# Patient Record
Sex: MALE | Race: ASIAN | Employment: FULL TIME | URBAN - METROPOLITAN AREA
[De-identification: names, ages, dates, MRNs, and addresses within clinical notes are randomized per-mention and may not be internally consistent; named-entity substitution may affect disease eponyms.]

---

## 2019-06-05 ENCOUNTER — TELEPHONE (OUTPATIENT)
Dept: CARDIOLOGY CLINIC | Age: 34
End: 2019-06-05

## 2019-06-05 NOTE — TELEPHONE ENCOUNTER
Faxed records request to Dr. Kina Martinez office. Future Appointments   Date Time Provider Miguel Jodi   6/11/2019  9:00 AM Gerardo Burroughs  [address]        6/6/19-Received records, including ekgs.

## 2019-06-11 ENCOUNTER — OFFICE VISIT (OUTPATIENT)
Dept: CARDIOLOGY CLINIC | Age: 34
End: 2019-06-11

## 2019-06-11 VITALS
DIASTOLIC BLOOD PRESSURE: 94 MMHG | HEART RATE: 78 BPM | OXYGEN SATURATION: 98 % | BODY MASS INDEX: 26.96 KG/M2 | HEIGHT: 69 IN | SYSTOLIC BLOOD PRESSURE: 142 MMHG | RESPIRATION RATE: 16 BRPM | WEIGHT: 182 LBS

## 2019-06-11 DIAGNOSIS — R00.2 PALPITATIONS: ICD-10-CM

## 2019-06-11 DIAGNOSIS — R07.89 OTHER CHEST PAIN: ICD-10-CM

## 2019-06-11 DIAGNOSIS — I10 ESSENTIAL HYPERTENSION: Primary | ICD-10-CM

## 2019-06-11 RX ORDER — ALBUTEROL SULFATE 90 UG/1
AEROSOL, METERED RESPIRATORY (INHALATION)
COMMUNITY
Start: 2019-04-06 | End: 2022-05-06 | Stop reason: ALTCHOICE

## 2019-06-11 RX ORDER — LISINOPRIL 10 MG/1
10 TABLET ORAL DAILY
Qty: 30 TAB | Refills: 1 | Status: SHIPPED | OUTPATIENT
Start: 2019-06-11 | End: 2019-08-22 | Stop reason: SDUPTHER

## 2019-06-11 RX ORDER — PROPRANOLOL HYDROCHLORIDE 120 MG/1
CAPSULE, EXTENDED RELEASE ORAL
Refills: 0 | COMMUNITY
Start: 2019-05-20 | End: 2019-07-31 | Stop reason: SDUPTHER

## 2019-06-11 NOTE — PROGRESS NOTES
HISTORY OF PRESENT ILLNESS  Ana Godfrey is a 35 y.o. male     SUMMARY:   Problem List  Date Reviewed: 6/11/2019          Codes Class Noted    Essential hypertension ICD-10-CM: I10  ICD-9-CM: 401.9  6/11/2019        Other chest pain ICD-10-CM: R07.89  ICD-9-CM: 786.59  6/11/2019              Current Outpatient Medications on File Prior to Visit   Medication Sig    propranolol LA (INDERAL LA) 120 mg SR capsule TAKE 1 CAPSULE BY MOUTH EVERY DAY    PROAIR HFA 90 mcg/actuation inhaler      No current facility-administered medications on file prior to visit. CARDIOLOGY STUDIES TO DATE:  No specialty comments available. Chief Complaint   Patient presents with    New Patient     HPI :  Mr. Brant Colon is a 35year-old referred by [facility] for cardiac evaluation. He has had hypertension since about age 32 when he was diagnosed with Grave's disease. In April, he was in [facility] with asthmatic bronchitis and he had some musculoskeletal type chest pain. All the symptoms resolved with antibiotics, inhalers and Prednisone. His blood pressure has not been well controlled. He is active at work, but gets no regular exercise and does those activities without any worrisome symptoms. He thinks his cholesterol is borderline. He has never smoked. There is no history of diabetes. Family history is negative for premature coronary disease. He has had palpitations off and on for years. He usually notices them if he misses or is late on his Propranolol dose. His EKG from [facility] showed normal sinus rhythm with LVH by volts. He has frequent heartburn, indigestion. He has some mild generalized aches and pains and has had a long history of nervousness, depression, anxiety and difficulty sleeping.         CARDIAC ROS:   negative for chest pain, dyspnea, syncope, orthopnea, paroxysmal nocturnal dyspnea, exertional chest pressure/discomfort, claudication, lower extremity edema    Family History   Problem Relation Age of Onset    Breast Cancer Mother     Hypertension Father        Past Medical History:   Diagnosis Date    Graves disease        GENERAL ROS:  A comprehensive review of systems was negative except for that written in the HPI. Visit Vitals  BP (!) 142/94 (BP 1 Location: Left arm, BP Patient Position: Sitting)   Pulse 78   Resp 16   Ht 5' 9\" (1.753 m)   Wt 182 lb (82.6 kg)   SpO2 98%   BMI 26.88 kg/m²       Wt Readings from Last 3 Encounters:   06/11/19 182 lb (82.6 kg)            BP Readings from Last 3 Encounters:   06/11/19 (!) 142/94       PHYSICAL EXAM  General appearance: alert, cooperative, no distress, appears stated age  Neurologic: Alert and oriented X 3  Neck: supple, symmetrical, trachea midline, no adenopathy, no carotid bruit and no JVD  Lungs: clear to auscultation bilaterally  Heart: regular rate and rhythm, S1, S2 normal, no murmur, click, rub or gallop  Abdomen: soft, non-tender. Bowel sounds normal. No masses,  no organomegaly  Extremities: extremities normal, atraumatic, no cyanosis or edema  Pulses: 2+ and symmetric      ASSESSMENT  Mr. Gloria Lal' EKG abnormalities are of no concern and I reassured him in that regard. I do think we should do something about his blood pressure and he remembers having been on Lisinopril years ago, which seemed to be effective, so we are going to resume that at 10 mg a day. We are going to send him for a fasting lipid profile to see where we  terms of cholesterol and I told him if the palpitations start to become more frequent or bothersome to let us know and we will provide him with an event monitor. I do not think he needs any imaging or stress testing at this time. current treatment plan is effective, no change in therapy  lab results and schedule of future lab studies reviewed with patient  reviewed diet, exercise and weight control    Encounter Diagnoses   Name Primary?     Essential hypertension Yes    Other chest pain     Palpitations      Orders Placed This Encounter    LIPID PANEL    propranolol LA (INDERAL LA) 120 mg SR capsule    PROAIR HFA 90 mcg/actuation inhaler    lisinopril (PRINIVIL, ZESTRIL) 10 mg tablet       Follow-up and Dispositions    · Return in about 1 month (around 7/9/2019).          Norma Scherer MD  6/11/2019

## 2019-06-11 NOTE — PROGRESS NOTES
1. Have you been to the ER, urgent care clinic since your last visit? Hospitalized since your last visit? Yes When: 4/9/2019 Where: [facility] Reason for visit: asthma    2. Have you seen or consulted any other health care providers outside of the [address] [place] [address] since your last visit? Include any pap smears or colon screening. No    Patient reports Hx: Graves Disease  Patient reports Chest tightness years.      Visit Vitals  BP (!) 142/94 (BP 1 Location: Left arm, BP Patient Position: Sitting)   Pulse 78   Resp 16   Ht 5' 9\" (1.753 m)   Wt 182 lb (82.6 kg)   SpO2 98%   BMI 26.88 kg/m²

## 2019-06-18 LAB
CHOLEST SERPL-MCNC: 185 MG/DL (ref 100–199)
HDLC SERPL-MCNC: 37 MG/DL
INTERPRETATION, 910389: NORMAL
LDLC SERPL CALC-MCNC: 110 MG/DL (ref 0–99)
TRIGL SERPL-MCNC: 192 MG/DL (ref 0–149)
VLDLC SERPL CALC-MCNC: 38 MG/DL (ref 5–40)

## 2019-06-18 NOTE — PROGRESS NOTES
Chol ok, triglycerides slightly elevated. Need to cut back on carbs and sweets. No need for medication at this point.

## 2019-06-18 NOTE — PROGRESS NOTES
Called patient. Left message on safe voicemail stating results and Dr. Edd Decker message. Also stated I am mailing him a results letter. Result letter in mail.

## 2019-07-25 ENCOUNTER — OFFICE VISIT (OUTPATIENT)
Dept: CARDIOLOGY CLINIC | Age: 34
End: 2019-07-25

## 2019-07-25 VITALS
OXYGEN SATURATION: 98 % | HEART RATE: 74 BPM | SYSTOLIC BLOOD PRESSURE: 122 MMHG | DIASTOLIC BLOOD PRESSURE: 84 MMHG | BODY MASS INDEX: 26.28 KG/M2 | RESPIRATION RATE: 16 BRPM | HEIGHT: 69 IN | WEIGHT: 177.4 LBS

## 2019-07-25 DIAGNOSIS — I10 ESSENTIAL HYPERTENSION: Primary | ICD-10-CM

## 2019-07-25 DIAGNOSIS — R07.89 OTHER CHEST PAIN: ICD-10-CM

## 2019-07-25 DIAGNOSIS — R00.2 PALPITATIONS: ICD-10-CM

## 2019-07-25 NOTE — PROGRESS NOTES
HISTORY OF PRESENT Zeb Moura is a 29 y.o. male     SUMMARY:   Problem List  Date Reviewed: 7/24/2019          Codes Class Noted    Essential hypertension ICD-10-CM: I10  ICD-9-CM: 401.9  6/11/2019        Other chest pain ICD-10-CM: R07.89  ICD-9-CM: 786.59  6/11/2019              Current Outpatient Medications on File Prior to Visit   Medication Sig    propranolol LA (INDERAL LA) 120 mg SR capsule TAKE 1 CAPSULE BY MOUTH EVERY DAY    PROAIR HFA 90 mcg/actuation inhaler     lisinopril (PRINIVIL, ZESTRIL) 10 mg tablet Take 1 Tab by mouth daily. No current facility-administered medications on file prior to visit. CARDIOLOGY STUDIES TO DATE:  No specialty comments available. Chief Complaint   Patient presents with    Hypertension     HPI :  Mr. Phu Cartagena' blood pressure is doing much, much better, but he thinks the Lisinopril may be causing some erectile dysfunction and extra sleepiness. He is active. He is not exercising. It sounds like he used to be on Nuvigil for narcolepsy and he has not had any of that for quite some time. No recent chest pain or palpitations. CARDIAC ROS:   negative for dyspnea, syncope, orthopnea, paroxysmal nocturnal dyspnea, exertional chest pressure/discomfort, claudication, lower extremity edema    Family History   Problem Relation Age of Onset    Breast Cancer Mother     Hypertension Father        Past Medical History:   Diagnosis Date    Graves disease        GENERAL ROS:  A comprehensive review of systems was negative except for that written in the HPI.     Visit Vitals  /84 (BP 1 Location: Left arm, BP Patient Position: Sitting)   Pulse 74   Resp 16   Ht 5' 9\" (1.753 m)   Wt 177 lb 6.4 oz (80.5 kg)   SpO2 98%   BMI 26.20 kg/m²       Wt Readings from Last 3 Encounters:   07/25/19 177 lb 6.4 oz (80.5 kg)   06/11/19 182 lb (82.6 kg)            BP Readings from Last 3 Encounters:   07/25/19 122/84   06/11/19 (!) 142/94       PHYSICAL EXAM  General appearance: alert, cooperative, no distress, appears stated age  Neurologic: Alert and oriented X 3  Neck: supple, symmetrical, trachea midline, no adenopathy, no carotid bruit and no JVD  Lungs: clear to auscultation bilaterally  Heart: regular rate and rhythm, S1, S2 normal, no murmur, click, rub or gallop  Extremities: extremities normal, atraumatic, no cyanosis or edema    Lab Results   Component Value Date/Time    Cholesterol, total 185 06/17/2019 10:41 AM    HDL Cholesterol 37 (L) 06/17/2019 10:41 AM    LDL, calculated 110 (H) 06/17/2019 10:41 AM    Triglyceride 192 (H) 06/17/2019 10:41 AM     ASSESSMENT  Mr. Slime Latham is stable and asymptomatic at this point. Given his concerns, I think it is reasonable for him to cut his Lisinopril dose in half for a while and monitor his blood pressure. He will let us know how it is going. current treatment plan is effective, no change in therapy  lab results and schedule of future lab studies reviewed with patient  reviewed diet, exercise and weight control    Encounter Diagnoses   Name Primary?  Essential hypertension Yes    Other chest pain     Palpitations      No orders of the defined types were placed in this encounter. Follow-up and Dispositions    · Return in about 3 months (around 10/25/2019).          Jose Ngo MD  7/25/2019

## 2019-07-31 DIAGNOSIS — R00.2 PALPITATIONS: ICD-10-CM

## 2019-07-31 DIAGNOSIS — I10 ESSENTIAL HYPERTENSION: Primary | ICD-10-CM

## 2019-07-31 RX ORDER — PROPRANOLOL HYDROCHLORIDE 120 MG/1
CAPSULE, EXTENDED RELEASE ORAL
Qty: 30 CAP | Refills: 1 | Status: SHIPPED | OUTPATIENT
Start: 2019-07-31 | End: 2019-10-08 | Stop reason: SDUPTHER

## 2019-07-31 NOTE — TELEPHONE ENCOUNTER
Requested Prescriptions     Signed Prescriptions Disp Refills    propranolol LA (INDERAL LA) 120 mg SR capsule 30 Cap 1     Sig: TAKE 1 CAPSULE BY MOUTH EVERY DAY     Authorizing Provider: Dhara Beltrán     Ordering User: Jose Alberto Heller    Per Dr. Heather Waller verbal orders

## 2019-08-15 ENCOUNTER — TELEPHONE (OUTPATIENT)
Dept: CARDIOLOGY CLINIC | Age: 34
End: 2019-08-15

## 2019-08-15 NOTE — TELEPHONE ENCOUNTER
Claudio Nguyen (NP) with [employer] called to see it if would be ok for the pt to start back taking Nuvigil  mg. He can be reached or a message can be left at the  at [phone].     [employer]

## 2019-08-15 NOTE — TELEPHONE ENCOUNTER
Dr. Kary Clinton-  I see you mentioned this in your note. Please advise if okay to restart Nuvigil from cardiac standpoint. Thanks.

## 2019-08-22 DIAGNOSIS — I10 ESSENTIAL HYPERTENSION: ICD-10-CM

## 2019-08-22 DIAGNOSIS — R00.2 PALPITATIONS: ICD-10-CM

## 2019-08-22 DIAGNOSIS — R07.89 OTHER CHEST PAIN: ICD-10-CM

## 2019-08-23 RX ORDER — LISINOPRIL 10 MG/1
TABLET ORAL
Qty: 30 TAB | Refills: 5 | Status: SHIPPED | OUTPATIENT
Start: 2019-08-23 | End: 2020-02-18

## 2019-08-23 NOTE — TELEPHONE ENCOUNTER
Requested Prescriptions     Signed Prescriptions Disp Refills    lisinopril (PRINIVIL, ZESTRIL) 10 mg tablet 30 Tab 5     Sig: TAKE 1 TABLET BY MOUTH EVERY DAY     Authorizing Provider: Jane Mackenzie     Ordering User: Jeanette Frank verbal orders

## 2019-09-05 ENCOUNTER — OFFICE VISIT (OUTPATIENT)
Dept: CARDIOLOGY CLINIC | Age: 34
End: 2019-09-05

## 2019-09-05 VITALS
WEIGHT: 174 LBS | SYSTOLIC BLOOD PRESSURE: 108 MMHG | BODY MASS INDEX: 25.77 KG/M2 | OXYGEN SATURATION: 97 % | HEIGHT: 69 IN | RESPIRATION RATE: 18 BRPM | DIASTOLIC BLOOD PRESSURE: 72 MMHG | HEART RATE: 62 BPM

## 2019-09-05 DIAGNOSIS — R00.2 PALPITATIONS: ICD-10-CM

## 2019-09-05 DIAGNOSIS — I10 ESSENTIAL HYPERTENSION: Primary | ICD-10-CM

## 2019-09-05 RX ORDER — FLUOXETINE HYDROCHLORIDE 20 MG/1
20 CAPSULE ORAL DAILY
Refills: 2 | COMMUNITY
Start: 2019-08-14

## 2019-09-05 RX ORDER — HYDROXYZINE PAMOATE 25 MG/1
25 CAPSULE ORAL AS NEEDED
Refills: 1 | COMMUNITY
Start: 2019-08-14

## 2019-10-08 DIAGNOSIS — I10 ESSENTIAL HYPERTENSION: ICD-10-CM

## 2019-10-08 DIAGNOSIS — R00.2 PALPITATIONS: ICD-10-CM

## 2019-10-08 RX ORDER — PROPRANOLOL HYDROCHLORIDE 120 MG/1
CAPSULE, EXTENDED RELEASE ORAL
Qty: 90 CAP | Refills: 1 | Status: SHIPPED | OUTPATIENT
Start: 2019-10-08 | End: 2020-04-07 | Stop reason: SDUPTHER

## 2019-10-08 NOTE — TELEPHONE ENCOUNTER
Requested Prescriptions     Signed Prescriptions Disp Refills    propranolol LA (INDERAL LA) 120 mg SR capsule 90 Cap 1     Sig: TAKE 1 CAPSULE BY MOUTH EVERY DAY     Authorizing Provider: Roxanna Wallace     Ordering User: Susie Rubio     Verbal order per Dr. Kalani Perez.       Future Appointments   Date Time Provider Miguel Zapata   12/3/2019  8:40 AM Caren Grant  [address]

## 2019-12-03 ENCOUNTER — OFFICE VISIT (OUTPATIENT)
Dept: CARDIOLOGY CLINIC | Age: 34
End: 2019-12-03

## 2019-12-03 VITALS
HEIGHT: 69 IN | SYSTOLIC BLOOD PRESSURE: 116 MMHG | HEART RATE: 64 BPM | WEIGHT: 179.2 LBS | BODY MASS INDEX: 26.54 KG/M2 | DIASTOLIC BLOOD PRESSURE: 76 MMHG | RESPIRATION RATE: 16 BRPM | OXYGEN SATURATION: 99 %

## 2019-12-03 DIAGNOSIS — I10 ESSENTIAL HYPERTENSION: Primary | ICD-10-CM

## 2019-12-03 DIAGNOSIS — R00.2 PALPITATIONS: ICD-10-CM

## 2019-12-03 RX ORDER — ARMODAFINIL 50 MG/1
TABLET ORAL
Refills: 0 | COMMUNITY
Start: 2019-11-19

## 2019-12-03 NOTE — PROGRESS NOTES
HISTORY OF PRESENT ILLNESS  Sherry Blair is a 29 y.o. male     SUMMARY:   Problem List  Date Reviewed: 12/2/2019          Codes Class Noted    Essential hypertension ICD-10-CM: I10  ICD-9-CM: 401.9  6/11/2019        Other chest pain ICD-10-CM: R07.89  ICD-9-CM: 786.59  6/11/2019              Current Outpatient Medications on File Prior to Visit   Medication Sig    armodafinil 50 mg tab TAKE 1 TABLET BY MOUTH EVERY DAY AS NEEDED    propranolol LA (INDERAL LA) 120 mg SR capsule TAKE 1 CAPSULE BY MOUTH EVERY DAY    FLUoxetine (PROZAC) 20 mg capsule Take 20 mg by mouth daily.  hydrOXYzine pamoate (VISTARIL) 25 mg capsule Take 25 mg by mouth as needed.  lisinopril (PRINIVIL, ZESTRIL) 10 mg tablet TAKE 1 TABLET BY MOUTH EVERY DAY    PROAIR HFA 90 mcg/actuation inhaler every four (4) hours as needed. No current facility-administered medications on file prior to visit. CARDIOLOGY STUDIES TO DATE:  No specialty comments available. Chief Complaint   Patient presents with    Hypertension     HPI :  Mr. Justen Reina is doing great. He is active, but not exercising as much as he had been. No problem with his medications and blood pressure is under excellent control. No recent palpitations. CARDIAC ROS:   negative for chest pain, dyspnea, syncope, orthopnea, paroxysmal nocturnal dyspnea, exertional chest pressure/discomfort, claudication, lower extremity edema    Family History   Problem Relation Age of Onset    Breast Cancer Mother     Hypertension Father        Past Medical History:   Diagnosis Date    Graves disease        GENERAL ROS:  A comprehensive review of systems was negative except for that written in the HPI.     Visit Vitals  /76 (BP 1 Location: Left arm, BP Patient Position: Sitting)   Pulse 64   Resp 16   Ht 5' 9\" (1.753 m)   Wt 179 lb 3.2 oz (81.3 kg)   SpO2 99%   BMI 26.46 kg/m²       Wt Readings from Last 3 Encounters:   12/03/19 179 lb 3.2 oz (81.3 kg)   09/05/19 174 lb (78.9 kg)   07/25/19 177 lb 6.4 oz (80.5 kg)            BP Readings from Last 3 Encounters:   12/03/19 116/76   09/05/19 108/72   07/25/19 122/84       PHYSICAL EXAM  General appearance: alert, cooperative, no distress, appears stated age  Neurologic: Alert and oriented X 3  Neck: supple, symmetrical, trachea midline, no adenopathy, no carotid bruit and no JVD  Lungs: clear to auscultation bilaterally  Heart: regular rate and rhythm, S1, S2 normal, no murmur, click, rub or gallop  Extremities: extremities normal, atraumatic, no cyanosis or edema    Lab Results   Component Value Date/Time    Cholesterol, total 185 06/17/2019 10:41 AM    HDL Cholesterol 37 (L) 06/17/2019 10:41 AM    LDL, calculated 110 (H) 06/17/2019 10:41 AM    Triglyceride 192 (H) 06/17/2019 10:41 AM     ASSESSMENT  Mr. Anish Iverson is stable and asymptomatic and well compensated on a good medical regimen and needs no cardiac testing at this time. current treatment plan is effective, no change in therapy  lab results and schedule of future lab studies reviewed with patient  reviewed diet, exercise and weight control    Encounter Diagnoses   Name Primary?  Essential hypertension Yes    Palpitations      Orders Placed This Encounter    armodafinil 50 mg tab       Follow-up and Dispositions    · Return in about 3 months (around 3/3/2020).          Idalia Lieberman MD  12/3/2019

## 2020-02-17 DIAGNOSIS — R00.2 PALPITATIONS: ICD-10-CM

## 2020-02-17 DIAGNOSIS — R07.89 OTHER CHEST PAIN: ICD-10-CM

## 2020-02-17 DIAGNOSIS — I10 ESSENTIAL HYPERTENSION: ICD-10-CM

## 2020-02-18 RX ORDER — LISINOPRIL 10 MG/1
TABLET ORAL
Qty: 30 TAB | Refills: 5 | Status: SHIPPED | OUTPATIENT
Start: 2020-02-18 | End: 2020-08-24

## 2020-04-07 DIAGNOSIS — I10 ESSENTIAL HYPERTENSION: ICD-10-CM

## 2020-04-07 DIAGNOSIS — R00.2 PALPITATIONS: ICD-10-CM

## 2020-04-07 RX ORDER — PROPRANOLOL HYDROCHLORIDE 120 MG/1
120 CAPSULE, EXTENDED RELEASE ORAL DAILY
Qty: 90 CAP | Refills: 1 | Status: SHIPPED | OUTPATIENT
Start: 2020-04-07 | End: 2020-10-05

## 2020-04-07 NOTE — TELEPHONE ENCOUNTER
Requested Prescriptions     Signed Prescriptions Disp Refills    propranolol LA (INDERAL LA) 120 mg SR capsule 90 Cap 1     Sig: Take 1 Cap by mouth daily.      Authorizing Provider: Pearl Avilez     Ordering User: Justyn Stack    Per Dr. Rossy Lynne verbal orders     Future Appointments   Date Time Provider Miguel Zapata   6/4/2020  8:40 AM Rosalea Runner,  [address]

## 2020-06-01 ENCOUNTER — TELEPHONE (OUTPATIENT)
Dept: CARDIOLOGY CLINIC | Age: 35
End: 2020-06-01

## 2020-06-01 NOTE — TELEPHONE ENCOUNTER
LM to rs 6/4/20 appt with Dr. Syl Bonilla, pt needs VV or appt after 7/1/20, please message Abigail Wheeler to schedule if VV needed

## 2020-08-23 DIAGNOSIS — R07.89 OTHER CHEST PAIN: ICD-10-CM

## 2020-08-23 DIAGNOSIS — I10 ESSENTIAL HYPERTENSION: ICD-10-CM

## 2020-08-23 DIAGNOSIS — R00.2 PALPITATIONS: ICD-10-CM

## 2020-08-24 RX ORDER — LISINOPRIL 10 MG/1
TABLET ORAL
Qty: 30 TAB | Refills: 2 | Status: SHIPPED | OUTPATIENT
Start: 2020-08-24 | End: 2020-09-02 | Stop reason: SDUPTHER

## 2020-08-24 NOTE — TELEPHONE ENCOUNTER
Requested Prescriptions     Signed Prescriptions Disp Refills    lisinopriL (PRINIVIL, ZESTRIL) 10 mg tablet 30 Tab 2     Sig: TAKE 1 TABLET BY MOUTH EVERY DAY     Authorizing Provider: Darion Lewis     Ordering User: Justina Persons    Per Dr. Lotus Salmeron verbal orders

## 2020-09-02 ENCOUNTER — VIRTUAL VISIT (OUTPATIENT)
Dept: CARDIOLOGY CLINIC | Age: 35
End: 2020-09-02
Payer: MEDICAID

## 2020-09-02 DIAGNOSIS — R00.2 PALPITATIONS: ICD-10-CM

## 2020-09-02 DIAGNOSIS — R07.89 OTHER CHEST PAIN: ICD-10-CM

## 2020-09-02 DIAGNOSIS — I10 ESSENTIAL HYPERTENSION: Primary | ICD-10-CM

## 2020-09-02 PROCEDURE — 99213 OFFICE O/P EST LOW 20 MIN: CPT | Performed by: SPECIALIST

## 2020-09-02 RX ORDER — LISINOPRIL 10 MG/1
TABLET ORAL
Qty: 90 TAB | Refills: 3 | Status: SHIPPED | OUTPATIENT
Start: 2020-09-02 | End: 2021-09-30

## 2020-09-02 NOTE — PROGRESS NOTES
[facility] Telemedicine Encounter                                                         Pursuant to the emergency declaration under the [facility], [facility] waiver authority and the [employer] Act, this Virtual  Visit was conducted, with patient's consent, to reduce the patient's risk of exposure to COVID-19 and provide continuity of care for an established patient. Services were provided through a video synchronous discussion virtually to substitute for in-person clinic visit. No specialty comments available. Current Outpatient Medications on File Prior to Visit   Medication Sig    lisinopriL (PRINIVIL, ZESTRIL) 10 mg tablet TAKE 1 TABLET BY MOUTH EVERY DAY    propranolol LA (INDERAL LA) 120 mg SR capsule Take 1 Cap by mouth daily.  armodafinil 50 mg tab TAKE 1 TABLET BY MOUTH EVERY DAY AS NEEDED    FLUoxetine (PROZAC) 20 mg capsule Take 20 mg by mouth daily.  hydrOXYzine pamoate (VISTARIL) 25 mg capsule Take 25 mg by mouth as needed.  PROAIR HFA 90 mcg/actuation inhaler every four (4) hours as needed. No current facility-administered medications on file prior to visit. Subjective/HPI:   Loida Rosas is a 28 y.o. male who was seen by synchronous (real-time) audio-video technology on 9/2/2020. He is doing great. Still working nights and gets a lot of exercise as part of his job. No recent palpitations or chest pain, and most recent blood pressure was 118/71. His weight is stable. PCP Provider  Wojciech Bernabe MD  Past Medical History:   Diagnosis Date    Graves disease       No past surgical history on file.   Allergies   Allergen Reactions    Grape Anaphylaxis    Amoxicillin Hives    Grape Seed Other (comments)     Throat swelling, back pain    Other Medication Unknown (comments)     Antibiotic    Wellbutrin [Bupropion Hcl] Itching      Family History   Problem Relation Age of Onset    Breast Cancer Mother     Hypertension Father       Current Outpatient Medications   Medication Sig    lisinopriL (PRINIVIL, ZESTRIL) 10 mg tablet TAKE 1 TABLET BY MOUTH EVERY DAY    propranolol LA (INDERAL LA) 120 mg SR capsule Take 1 Cap by mouth daily.  armodafinil 50 mg tab TAKE 1 TABLET BY MOUTH EVERY DAY AS NEEDED    FLUoxetine (PROZAC) 20 mg capsule Take 20 mg by mouth daily.  hydrOXYzine pamoate (VISTARIL) 25 mg capsule Take 25 mg by mouth as needed.  PROAIR HFA 90 mcg/actuation inhaler every four (4) hours as needed. No current facility-administered medications for this visit. There were no vitals filed for this visit.   Social History     Socioeconomic History    Marital status:      Spouse name: Not on file    Number of children: Not on file    Years of education: Not on file    Highest education level: Not on file   Occupational History    Not on file   Social Needs    Financial resource strain: Not on file    Food insecurity     Worry: Not on file     Inability: Not on file    Transportation needs     Medical: Not on file     Non-medical: Not on file   Tobacco Use    Smoking status: Never Smoker    Smokeless tobacco: Never Used   Substance and Sexual Activity    Alcohol use: Yes     Comment: occas    Drug use: Never    Sexual activity: Not on file   Lifestyle    Physical activity     Days per week: Not on file     Minutes per session: Not on file    Stress: Not on file   Relationships    Social connections     Talks on phone: Not on file     Gets together: Not on file     Attends [employer] service: Not on file     Active member of club or organization: Not on file     Attends meetings of clubs or organizations: Not on file     Relationship status: Not on file    Intimate partner violence     Fear of current or ex partner: Not on file     Emotionally abused: Not on file     Physically abused: Not on file     Forced sexual activity: Not on file Other Topics Concern    Not on file   Social History Narrative    Not on file       Review of Symptoms  11 systems reviewed, negative other than as stated in the HPI    Physical Exam:    Due to this being a TeleHealth evaluation, many elements of the physical examination are unable to be assessed. General: Well developed, in no acute distress, cooperative and alert  HEENT: Pupils equal/round. No marked JVD visible on video. Respiratory: No audible wheezing, no signs of respiratory distress, lips non cyanotic  Extremities:  No edema  Neuro: A&Ox3, speech clear, no facial droop, answering questions appropriately  Skin: Skin color is normal. No rashes or lesions. Non diaphoretic on visible skin during exam      Cardiology Labs:  Lab Results   Component Value Date/Time    Cholesterol, total 185 06/17/2019 10:41 AM    HDL Cholesterol 37 (L) 06/17/2019 10:41 AM    LDL, calculated 110 (H) 06/17/2019 10:41 AM    Triglyceride 192 (H) 06/17/2019 10:41 AM       No results found for: NA, K, CL, CO2, AGAP, GLU, BUN, CREA, BUCR, GFRAA, GFRNA, CA, TBIL, TBILI, AP, TP, ALB, GLOB, AGRAT, ALT      Assessment:     Diagnoses and all orders for this visit:    1. Essential hypertension    2. Palpitations    3. Other chest pain        ICD-10-CM ICD-9-CM    1. Essential hypertension  I10 401.9    2. Palpitations  R00.2 785.1    3. Other chest pain  R07.89 786.59      No orders of the defined types were placed in this encounter. Plan:   He is stable and asymptomatic, well compensated on a good medical regimen and needs no cardiac testing at this time. current treatment plan is effective, no change in therapy  lab results and schedule of future lab studies reviewed with patient  reviewed diet, exercise and weight control  We discussed the expected course, resolution and complications of the diagnosis(es) in detail. Medication risks, benefits, costs, interactions, and alternatives were discussed as indicated.   I advised him to contact the office if his condition worsens, changes or fails to improve as anticipated. He expressed understanding with the diagnosis(es) and plan    Idalia Lieberman MD    Greater than 20 minutes was spent in direct video patient care, planning and chart review. This visit was conducted using [employer] telemedicine services.

## 2020-10-04 DIAGNOSIS — R00.2 PALPITATIONS: ICD-10-CM

## 2020-10-04 DIAGNOSIS — I10 ESSENTIAL HYPERTENSION: ICD-10-CM

## 2020-10-05 RX ORDER — PROPRANOLOL HYDROCHLORIDE 120 MG/1
CAPSULE, EXTENDED RELEASE ORAL
Qty: 90 CAP | Refills: 1 | Status: SHIPPED | OUTPATIENT
Start: 2020-10-05 | End: 2021-04-12 | Stop reason: SDUPTHER

## 2020-10-05 NOTE — TELEPHONE ENCOUNTER
Requested Prescriptions     Signed Prescriptions Disp Refills    propranolol LA (INDERAL LA) 120 mg SR capsule 90 Cap 1     Sig: TAKE 1 CAPSULE BY MOUTH EVERY DAY     Authorizing Provider: Kayleigh Roper     Ordering User: Janie Snellen    Per Dr. Rita Acosta verbal orders

## 2021-02-24 ENCOUNTER — TELEPHONE (OUTPATIENT)
Dept: CARDIOLOGY CLINIC | Age: 36
End: 2021-02-24

## 2021-04-12 DIAGNOSIS — I10 ESSENTIAL HYPERTENSION: ICD-10-CM

## 2021-04-12 DIAGNOSIS — R00.2 PALPITATIONS: ICD-10-CM

## 2021-04-12 RX ORDER — PROPRANOLOL HYDROCHLORIDE 120 MG/1
CAPSULE, EXTENDED RELEASE ORAL
Qty: 90 CAP | Refills: 0 | Status: SHIPPED | OUTPATIENT
Start: 2021-04-12 | End: 2021-04-20 | Stop reason: SDUPTHER

## 2021-04-12 NOTE — TELEPHONE ENCOUNTER
Requested Prescriptions     Signed Prescriptions Disp Refills    propranolol LA (INDERAL LA) 120 mg SR capsule 90 Cap 0     Sig: TAKE 1 CAPSULE BY MOUTH EVERY DAY     Authorizing Provider: Heidy Alva     Ordering User: Quintin Ayala    Per Dr. Jt Meek verbal orders     Future Appointments   Date Time Provider Miguel Zapata   4/16/2021  3:00 PM MD VANDANA Boyle [facility]

## 2021-04-20 ENCOUNTER — OFFICE VISIT (OUTPATIENT)
Dept: CARDIOLOGY CLINIC | Age: 36
End: 2021-04-20
Payer: MEDICAID

## 2021-04-20 VITALS
SYSTOLIC BLOOD PRESSURE: 118 MMHG | HEIGHT: 69 IN | OXYGEN SATURATION: 98 % | WEIGHT: 185 LBS | DIASTOLIC BLOOD PRESSURE: 82 MMHG | RESPIRATION RATE: 18 BRPM | BODY MASS INDEX: 27.4 KG/M2 | HEART RATE: 81 BPM

## 2021-04-20 DIAGNOSIS — I10 ESSENTIAL HYPERTENSION: Primary | ICD-10-CM

## 2021-04-20 DIAGNOSIS — R00.2 PALPITATIONS: ICD-10-CM

## 2021-04-20 DIAGNOSIS — R07.89 OTHER CHEST PAIN: ICD-10-CM

## 2021-04-20 PROCEDURE — 99213 OFFICE O/P EST LOW 20 MIN: CPT | Performed by: SPECIALIST

## 2021-04-20 RX ORDER — BUSPIRONE HYDROCHLORIDE 5 MG/1
5 TABLET ORAL 2 TIMES DAILY
COMMUNITY
Start: 2021-03-28

## 2021-04-20 RX ORDER — PROPRANOLOL HYDROCHLORIDE 120 MG/1
CAPSULE, EXTENDED RELEASE ORAL
Qty: 90 CAP | Refills: 3 | Status: SHIPPED | OUTPATIENT
Start: 2021-04-20 | End: 2022-05-18

## 2021-04-20 NOTE — PROGRESS NOTES
HISTORY OF PRESENT ILLNESS  Karthik Chino is a 28 y.o. male     SUMMARY:   Problem List  Date Reviewed: 4/20/2021          Codes Class Noted    Essential hypertension ICD-10-CM: I10  ICD-9-CM: 401.9  6/11/2019        Other chest pain ICD-10-CM: R07.89  ICD-9-CM: 786.59  6/11/2019              Current Outpatient Medications on File Prior to Visit   Medication Sig    busPIRone (BUSPAR) 5 mg tablet Take 5 mg by mouth two (2) times a day.  propranolol LA (INDERAL LA) 120 mg SR capsule TAKE 1 CAPSULE BY MOUTH EVERY DAY    lisinopriL (PRINIVIL, ZESTRIL) 10 mg tablet TAKE 1 TABLET BY MOUTH EVERY DAY    armodafinil 50 mg tab TAKE 1 TABLET BY MOUTH EVERY DAY AS NEEDED    FLUoxetine (PROZAC) 20 mg capsule Take 20 mg by mouth daily.  hydrOXYzine pamoate (VISTARIL) 25 mg capsule Take 25 mg by mouth as needed.  PROAIR HFA 90 mcg/actuation inhaler every four (4) hours as needed. No current facility-administered medications on file prior to visit. CARDIOLOGY STUDIES TO DATE:  No specialty comments available. Chief Complaint   Patient presents with    Follow-up     HPI :  He is doing great. Still working nights and that involves a lot of heavy physical activity which he tolerates without difficulty. Blood pressure is well controlled as is his anxiety. CARDIAC ROS:   negative for chest pain, dyspnea, palpitations, syncope, orthopnea, paroxysmal nocturnal dyspnea, exertional chest pressure/discomfort, claudication, lower extremity edema    Family History   Problem Relation Age of Onset    Breast Cancer Mother     Hypertension Father        Past Medical History:   Diagnosis Date    Graves disease        GENERAL ROS:  A comprehensive review of systems was negative except for that written in the HPI.     Visit Vitals  /82 (BP 1 Location: Left upper arm, BP Patient Position: Sitting, BP Cuff Size: Adult)   Pulse 81   Resp 18   Ht 5' 9\" (1.753 m)   Wt 185 lb (83.9 kg)   SpO2 98%   BMI 27.32 kg/m²       Wt Readings from Last 3 Encounters:   04/20/21 185 lb (83.9 kg)   12/03/19 179 lb 3.2 oz (81.3 kg)   09/05/19 174 lb (78.9 kg)            BP Readings from Last 3 Encounters:   04/20/21 118/82   12/03/19 116/76   09/05/19 108/72       PHYSICAL EXAM  General appearance: alert, cooperative, no distress, appears stated age  Neurologic: Alert and oriented X 3  Neck: supple, symmetrical, trachea midline, no adenopathy, no carotid bruit and no JVD  Lungs: clear to auscultation bilaterally  Heart: regular rate and rhythm, S1, S2 normal, no murmur, click, rub or gallop    Lab Results   Component Value Date/Time    Cholesterol, total 185 06/17/2019 10:41 AM    HDL Cholesterol 37 (L) 06/17/2019 10:41 AM    LDL, calculated 110 (H) 06/17/2019 10:41 AM    Triglyceride 192 (H) 06/17/2019 10:41 AM     ASSESSMENT :      He is stable and asymptomatic, well compensated on a good medical regimen and needs no cardiac testing at this time. current treatment plan is effective, no change in therapy  lab results and schedule of future lab studies reviewed with patient  reviewed diet, exercise and weight control    Encounter Diagnoses   Name Primary?  Essential hypertension Yes    Other chest pain     Palpitations      Orders Placed This Encounter    busPIRone (BUSPAR) 5 mg tablet       Follow-up and Dispositions    · Return in about 6 months (around 10/20/2021). Federico Butcher MD  4/20/2021  Please note that this dictation was completed with [employer], the computer voice recognition software. Quite often unanticipated grammatical, syntax, homophones, and other interpretive errors are inadvertently transcribed by the computer software. Please disregard these errors. Please excuse any errors that have escaped final proofreading. Thank you.

## 2021-09-30 DIAGNOSIS — R07.89 OTHER CHEST PAIN: ICD-10-CM

## 2021-09-30 DIAGNOSIS — R00.2 PALPITATIONS: ICD-10-CM

## 2021-09-30 DIAGNOSIS — I10 ESSENTIAL HYPERTENSION: ICD-10-CM

## 2021-09-30 RX ORDER — LISINOPRIL 10 MG/1
TABLET ORAL
Qty: 90 TABLET | Refills: 3 | Status: SHIPPED | OUTPATIENT
Start: 2021-09-30 | End: 2021-10-22

## 2021-09-30 NOTE — TELEPHONE ENCOUNTER
Requested Prescriptions     Signed Prescriptions Disp Refills    lisinopriL (PRINIVIL, ZESTRIL) 10 mg tablet 90 Tablet 3     Sig: TAKE 1 TABLET BY MOUTH EVERY DAY     Authorizing Provider: Cherylene Brand     Ordering User: Jonny Mejía    Per Dr. Perry Ends verbal orders

## 2021-10-22 ENCOUNTER — OFFICE VISIT (OUTPATIENT)
Dept: CARDIOLOGY CLINIC | Age: 36
End: 2021-10-22
Payer: MEDICAID

## 2021-10-22 VITALS
HEIGHT: 69 IN | BODY MASS INDEX: 27.55 KG/M2 | WEIGHT: 186 LBS | DIASTOLIC BLOOD PRESSURE: 90 MMHG | HEART RATE: 74 BPM | SYSTOLIC BLOOD PRESSURE: 142 MMHG | RESPIRATION RATE: 18 BRPM | OXYGEN SATURATION: 97 %

## 2021-10-22 DIAGNOSIS — R00.2 PALPITATIONS: ICD-10-CM

## 2021-10-22 DIAGNOSIS — R07.89 OTHER CHEST PAIN: ICD-10-CM

## 2021-10-22 DIAGNOSIS — I10 ESSENTIAL HYPERTENSION: Primary | ICD-10-CM

## 2021-10-22 PROCEDURE — 99214 OFFICE O/P EST MOD 30 MIN: CPT | Performed by: SPECIALIST

## 2021-10-22 RX ORDER — LISINOPRIL 20 MG/1
20 TABLET ORAL DAILY
Qty: 90 TABLET | Refills: 2 | Status: SHIPPED | OUTPATIENT
Start: 2021-10-22 | End: 2022-05-06 | Stop reason: SDUPTHER

## 2021-10-22 NOTE — PROGRESS NOTES
HISTORY OF PRESENT ILLNESS  Morteza Webber is a 39 y.o. male     SUMMARY:   Problem List  Date Reviewed: 10/22/2021        Codes Class Noted    Essential hypertension ICD-10-CM: I10  ICD-9-CM: 401.9  6/11/2019        Other chest pain ICD-10-CM: R07.89  ICD-9-CM: 786.59  6/11/2019              Current Outpatient Medications on File Prior to Visit   Medication Sig    lisinopriL (PRINIVIL, ZESTRIL) 10 mg tablet TAKE 1 TABLET BY MOUTH EVERY DAY    busPIRone (BUSPAR) 5 mg tablet Take 5 mg by mouth two (2) times a day.  propranolol LA (INDERAL LA) 120 mg SR capsule TAKE 1 CAPSULE BY MOUTH EVERY DAY    armodafinil 50 mg tab TAKE 1 TABLET BY MOUTH EVERY DAY AS NEEDED    FLUoxetine (PROZAC) 20 mg capsule Take 20 mg by mouth daily.  hydrOXYzine pamoate (VISTARIL) 25 mg capsule Take 25 mg by mouth as needed.  PROAIR HFA 90 mcg/actuation inhaler every four (4) hours as needed. No current facility-administered medications on file prior to visit. CARDIOLOGY STUDIES TO DATE:  No specialty comments available. Chief Complaint   Patient presents with    Follow-up     Patient hard of hearing (read lips)     HPI :  Apparently he has been found to have evidence of hyperthyroid again but unfortunately he is not able to see the endocrinologist until January. In conjunction with his he is noticed to elevation of his blood pressure in the range that we saw here today. He is very active at work plus playing and doing activities with his children with no cardiac type symptoms.   CARDIAC ROS:   negative for chest pain, dyspnea, palpitations, syncope, orthopnea, paroxysmal nocturnal dyspnea, exertional chest pressure/discomfort, claudication, lower extremity edema    Family History   Problem Relation Age of Onset    Breast Cancer Mother     Hypertension Father        Past Medical History:   Diagnosis Date    Graves disease        GENERAL ROS:  A comprehensive review of systems was negative except for that written in the HPI. Visit Vitals  BP (!) 142/90 (BP 1 Location: Left upper arm, BP Patient Position: Sitting, BP Cuff Size: Adult)   Pulse 74   Resp 18   Ht 5' 9\" (1.753 m)   Wt 186 lb (84.4 kg)   SpO2 97%   BMI 27.47 kg/m²       Wt Readings from Last 3 Encounters:   10/22/21 186 lb (84.4 kg)   04/20/21 185 lb (83.9 kg)   12/03/19 179 lb 3.2 oz (81.3 kg)            BP Readings from Last 3 Encounters:   10/22/21 (!) 142/90   04/20/21 118/82   12/03/19 116/76       PHYSICAL EXAM  General appearance: alert, cooperative, no distress, appears stated age  Neurologic: Alert and oriented X 3  Neck: supple, symmetrical, trachea midline, no adenopathy, no carotid bruit and no JVD  Lungs: clear to auscultation bilaterally  Heart: regular rate and rhythm, S1, S2 normal, no murmur, click, rub or gallop  Extremities: extremities normal, atraumatic, no cyanosis or edema    Lab Results   Component Value Date/Time    Cholesterol, total 185 06/17/2019 10:41 AM    HDL Cholesterol 37 (L) 06/17/2019 10:41 AM    LDL, calculated 110 (H) 06/17/2019 10:41 AM    Triglyceride 192 (H) 06/17/2019 10:41 AM     ASSESSMENT :      He is already on a good dose of beta-blocker and his heart rate is not elevated so working to double his lisinopril and recheck him in about 10 days. He was given written instructions in that regard. He is otherwise stable from a cardiac standpoint. current treatment plan is effective, no change in therapy  lab results and schedule of future lab studies reviewed with patient  reviewed diet, exercise and weight control    Encounter Diagnoses   Name Primary?  Essential hypertension Yes     No orders of the defined types were placed in this encounter. Follow-up and Dispositions    · Return in about 6 months (around 4/22/2022). Angelic Mckinney MD  10/22/2021  Please note that this dictation was completed with [employer], the [email or web address] voice recognition software.   Quite often unanticipated grammatical, syntax, homophones, and other interpretive errors are inadvertently transcribed by the computer software. Please disregard these errors. Please excuse any errors that have escaped final proofreading. Thank you.

## 2021-10-22 NOTE — PATIENT INSTRUCTIONS
Please increase your Lisinopril to 20mg daily - please take 2 of your 10mg tablets until you run out and then  your new prescription. We will see you back in about a week or two for a BP check. We will see you back in 6 months.

## 2021-11-03 ENCOUNTER — CLINICAL SUPPORT (OUTPATIENT)
Dept: CARDIOLOGY CLINIC | Age: 36
End: 2021-11-03

## 2021-11-03 VITALS — SYSTOLIC BLOOD PRESSURE: 124 MMHG | DIASTOLIC BLOOD PRESSURE: 80 MMHG

## 2021-11-03 DIAGNOSIS — I10 ESSENTIAL HYPERTENSION: Primary | ICD-10-CM

## 2021-11-03 NOTE — PROGRESS NOTES
BP WNL. Pt will stay on current medications and keep regular scheduled follow up. .    Future Appointments   Date Time Provider Miguel Zapata   4/25/2022  9:20 AM MD VANDANA Callejas AMB

## 2022-03-19 PROBLEM — I10 ESSENTIAL HYPERTENSION: Status: ACTIVE | Noted: 2019-06-11

## 2022-03-19 PROBLEM — R07.89 OTHER CHEST PAIN: Status: ACTIVE | Noted: 2019-06-11

## 2022-04-11 ENCOUNTER — OFFICE VISIT (OUTPATIENT)
Dept: INTERNAL MEDICINE CLINIC | Age: 37
End: 2022-04-11
Payer: MEDICAID

## 2022-04-11 VITALS
TEMPERATURE: 98 F | RESPIRATION RATE: 16 BRPM | HEIGHT: 69 IN | DIASTOLIC BLOOD PRESSURE: 72 MMHG | OXYGEN SATURATION: 99 % | HEART RATE: 70 BPM | WEIGHT: 176 LBS | BODY MASS INDEX: 26.07 KG/M2 | SYSTOLIC BLOOD PRESSURE: 140 MMHG

## 2022-04-11 DIAGNOSIS — I10 ESSENTIAL HYPERTENSION: ICD-10-CM

## 2022-04-11 DIAGNOSIS — E05.00 GRAVES DISEASE: ICD-10-CM

## 2022-04-11 DIAGNOSIS — Z00.00 ENCOUNTER FOR MEDICAL EXAMINATION TO ESTABLISH CARE: Primary | ICD-10-CM

## 2022-04-11 DIAGNOSIS — F32.A DEPRESSION, UNSPECIFIED DEPRESSION TYPE: ICD-10-CM

## 2022-04-11 DIAGNOSIS — Z11.59 NEED FOR HEPATITIS C SCREENING TEST: ICD-10-CM

## 2022-04-11 DIAGNOSIS — Z87.442 HISTORY OF KIDNEY STONES: ICD-10-CM

## 2022-04-11 PROCEDURE — 99203 OFFICE O/P NEW LOW 30 MIN: CPT | Performed by: INTERNAL MEDICINE

## 2022-04-11 NOTE — PROGRESS NOTES
Health Maintenance Due   Topic Date Due    Hepatitis C Screening  Never done    DTaP/Tdap/Td series (1 - Tdap) Never done    COVID-19 Vaccine (3 - Booster for Pfizer series) 09/30/2021       Chief Complaint   Patient presents with    New Patient    Complete Physical    Migraine    Other     Weakness spells        1. Have you been to the ER, urgent care clinic since your last visit? Hospitalized since your last visit? No    2. Have you seen or consulted any other health care providers outside of the [address] [place] [address] since your last visit? Include any pap smears or colon screening. No    3) Do you have an Advance Directive on file? no    4) Are you interested in receiving information on Advance Directives? NO      Patient is accompanied by self I have received verbal consent from Colton López to discuss any/all medical information while they are present in the room.

## 2022-04-11 NOTE — PROGRESS NOTES
HISTORY OF PRESENT ILLNESS  Kori Luna is a 39 y.o. male. HPI: Patient presents to establish care. PMH HTN, Graves Disease, history of kidney stones, and [place]. Patient sees cardiology to manage BP. He takes his BP at home and it is 120-130/90s. He states he is having Grave's Disease symptoms such as headaches, and he would like to get his TSH checked. He has an appt with endocrinology but it keeps getting pushed back; currently set up for Sept. Also reports that he has suffered through mental health concerns during a times when he was not dx with graves. His symptoms had to be managed due to the time it took to find the overlap. He also is followed by mental health currently for depression, but was having schizophrenia like behaviors before graves was treated. He has also been having eye symptoms when he focuses his eyes move side to side for only a few seconds and then stop. He is concerned because his grandfather has MG. He was educated to see a eye doctor because he wears glasses but has not had his eyes checked since before the pandemic began. Visit Vitals  BP (!) 140/72 (BP 1 Location: Left upper arm, BP Patient Position: Sitting, BP Cuff Size: Adult)   Pulse 70   Temp 98 °F (36.7 °C) (Oral)   Resp 16   Ht 5' 9\" (1.753 m)   Wt 176 lb (79.8 kg)   SpO2 99%   BMI 25.99 kg/m²     Past Medical History:   Diagnosis Date    Graves disease      History reviewed. No pertinent surgical history.   Family History   Problem Relation Age of Onset    Breast Cancer Mother     Hypertension Father     Breast Cancer Maternal Aunt     Cancer Maternal Aunt         breast ca    Cancer Maternal Uncle         lung ca    Diabetes Maternal Grandmother     Cancer Maternal Grandfather         prostate and brain CA    Cancer Paternal Grandmother         lung ca    Thyroid Disease Paternal Grandfather     Myasthenia Gravis Paternal Grandfather     Cancer Maternal Uncle         colon ca       Review of Systems   HENT: Hard of hearing    Eyes:        Eyes move side to side for a few seconds when concentrating    Neurological: Positive for headaches. Psychiatric/Behavioral: Positive for memory loss. All other systems reviewed and are negative. Physical Exam  Vitals reviewed. Constitutional:       Appearance: Normal appearance. HENT:      Head: Normocephalic and atraumatic. Eyes:      Extraocular Movements: Extraocular movements intact. Conjunctiva/sclera: Conjunctivae normal.      Pupils: Pupils are equal, round, and reactive to light. Cardiovascular:      Rate and Rhythm: Normal rate and regular rhythm. Heart sounds: Normal heart sounds. Pulmonary:      Effort: Pulmonary effort is normal.      Breath sounds: Normal breath sounds. Musculoskeletal:         General: Normal range of motion. Skin:     General: Skin is warm and dry. Neurological:      General: No focal deficit present. Mental Status: He is alert. Psychiatric:         Attention and Perception: Attention normal.         Mood and Affect: Affect is blunt. Speech: Speech normal.         Behavior: Behavior is cooperative. ASSESSMENT and PLAN  Diagnoses and all orders for this visit:    1. Encounter for medical examination to establish care  -     METABOLIC PANEL, COMPREHENSIVE; Future  -     CBC WITH AUTOMATED DIFF; Future  -     LIPID PANEL; Future    2. Depression, unspecified depression type        -    Continues to follow up with psych NP     3. Essential hypertension        -    Continue to follow up with cardiology     4. History of kidney stones    5. Graves disease  -     TSH 3RD GENERATION; Future    6. Need for hepatitis C screening test  -     HEPATITIS C AB; Future      Follow-up and Dispositions    · Return in about 6 months (around 10/11/2022), or if symptoms worsen or fail to improve.

## 2022-04-12 LAB
ALBUMIN SERPL-MCNC: 4.4 G/DL (ref 4–5)
ALBUMIN/GLOB SERPL: 1.6 {RATIO} (ref 1.2–2.2)
ALP SERPL-CCNC: 112 IU/L (ref 44–121)
ALT SERPL-CCNC: 38 IU/L (ref 0–44)
AST SERPL-CCNC: 24 IU/L (ref 0–40)
BASOPHILS # BLD AUTO: 0.1 X10E3/UL (ref 0–0.2)
BASOPHILS NFR BLD AUTO: 1 %
BILIRUB SERPL-MCNC: 0.4 MG/DL (ref 0–1.2)
BUN SERPL-MCNC: 5 MG/DL (ref 6–20)
BUN/CREAT SERPL: 5 (ref 9–20)
CALCIUM SERPL-MCNC: 9.1 MG/DL (ref 8.7–10.2)
CHLORIDE SERPL-SCNC: 105 MMOL/L (ref 96–106)
CHOLEST SERPL-MCNC: 229 MG/DL (ref 100–199)
CO2 SERPL-SCNC: 17 MMOL/L (ref 20–29)
CREAT SERPL-MCNC: 0.91 MG/DL (ref 0.76–1.27)
EGFR: 112 ML/MIN/1.73
EOSINOPHIL # BLD AUTO: 0.5 X10E3/UL (ref 0–0.4)
EOSINOPHIL NFR BLD AUTO: 6 %
ERYTHROCYTE [DISTWIDTH] IN BLOOD BY AUTOMATED COUNT: 13 % (ref 11.6–15.4)
GLOBULIN SER CALC-MCNC: 2.7 G/DL (ref 1.5–4.5)
GLUCOSE SERPL-MCNC: 103 MG/DL (ref 65–99)
HCT VFR BLD AUTO: 50.9 % (ref 37.5–51)
HCV AB S/CO SERPL IA: <0.1 S/CO RATIO (ref 0–0.9)
HDLC SERPL-MCNC: 39 MG/DL
HGB BLD-MCNC: 17.5 G/DL (ref 13–17.7)
IMM GRANULOCYTES # BLD AUTO: 0 X10E3/UL (ref 0–0.1)
IMM GRANULOCYTES NFR BLD AUTO: 0 %
IMP & REVIEW OF LAB RESULTS: NORMAL
LDLC SERPL CALC-MCNC: 154 MG/DL (ref 0–99)
LYMPHOCYTES # BLD AUTO: 2.1 X10E3/UL (ref 0.7–3.1)
LYMPHOCYTES NFR BLD AUTO: 25 %
MCH RBC QN AUTO: 30.4 PG (ref 26.6–33)
MCHC RBC AUTO-ENTMCNC: 34.4 G/DL (ref 31.5–35.7)
MCV RBC AUTO: 88 FL (ref 79–97)
MONOCYTES # BLD AUTO: 0.6 X10E3/UL (ref 0.1–0.9)
MONOCYTES NFR BLD AUTO: 7 %
NEUTROPHILS # BLD AUTO: 5.2 X10E3/UL (ref 1.4–7)
NEUTROPHILS NFR BLD AUTO: 61 %
PLATELET # BLD AUTO: 229 X10E3/UL (ref 150–450)
POTASSIUM SERPL-SCNC: 4.3 MMOL/L (ref 3.5–5.2)
PROT SERPL-MCNC: 7.1 G/DL (ref 6–8.5)
RBC # BLD AUTO: 5.76 X10E6/UL (ref 4.14–5.8)
SODIUM SERPL-SCNC: 142 MMOL/L (ref 134–144)
TRIGL SERPL-MCNC: 194 MG/DL (ref 0–149)
TSH SERPL DL<=0.005 MIU/L-ACNC: 0.44 UIU/ML (ref 0.45–4.5)
VLDLC SERPL CALC-MCNC: 36 MG/DL (ref 5–40)
WBC # BLD AUTO: 8.5 X10E3/UL (ref 3.4–10.8)

## 2022-04-13 NOTE — PROGRESS NOTES
Lipid panel is abnormal. Really has to watch the saturated fats, red meats, oils and processed foods. TSH low. Please all t3 and t4.  But repeat TSH in 6 weeks

## 2022-05-06 ENCOUNTER — OFFICE VISIT (OUTPATIENT)
Dept: CARDIOLOGY CLINIC | Age: 37
End: 2022-05-06
Payer: MEDICAID

## 2022-05-06 VITALS
BODY MASS INDEX: 25.92 KG/M2 | WEIGHT: 175 LBS | OXYGEN SATURATION: 96 % | SYSTOLIC BLOOD PRESSURE: 116 MMHG | RESPIRATION RATE: 16 BRPM | HEIGHT: 69 IN | HEART RATE: 62 BPM | DIASTOLIC BLOOD PRESSURE: 80 MMHG

## 2022-05-06 DIAGNOSIS — R00.2 PALPITATIONS: ICD-10-CM

## 2022-05-06 DIAGNOSIS — R07.89 OTHER CHEST PAIN: ICD-10-CM

## 2022-05-06 DIAGNOSIS — I10 ESSENTIAL HYPERTENSION: Primary | ICD-10-CM

## 2022-05-06 DIAGNOSIS — E05.00 GRAVES DISEASE: ICD-10-CM

## 2022-05-06 PROCEDURE — 99213 OFFICE O/P EST LOW 20 MIN: CPT | Performed by: SPECIALIST

## 2022-05-06 RX ORDER — LISINOPRIL 20 MG/1
20 TABLET ORAL DAILY
Qty: 90 TABLET | Refills: 2 | Status: SHIPPED | OUTPATIENT
Start: 2022-05-06

## 2022-05-06 NOTE — PROGRESS NOTES
HISTORY OF PRESENT ILLNESS  Arleen Brody is a 39 y.o. male     SUMMARY:   Problem List  Date Reviewed: 5/6/2022          Codes Class Noted    History of kidney stones ICD-10-CM: Z87.442  ICD-9-CM: V13.01  4/11/2022        Graves disease ICD-10-CM: E05.00  ICD-9-CM: 242.00  4/11/2022        Essential hypertension ICD-10-CM: I10  ICD-9-CM: 401.9  6/11/2019        Other chest pain ICD-10-CM: R07.89  ICD-9-CM: 786.59  6/11/2019              Current Outpatient Medications on File Prior to Visit   Medication Sig    lisinopriL (PRINIVIL, ZESTRIL) 20 mg tablet Take 1 Tablet by mouth daily.  busPIRone (BUSPAR) 5 mg tablet Take 5 mg by mouth two (2) times a day.  propranolol LA (INDERAL LA) 120 mg SR capsule TAKE 1 CAPSULE BY MOUTH EVERY DAY    armodafinil 50 mg tab TAKE 1 TABLET BY MOUTH EVERY DAY AS NEEDED    FLUoxetine (PROZAC) 20 mg capsule Take 20 mg by mouth daily.  hydrOXYzine pamoate (VISTARIL) 25 mg capsule Take 25 mg by mouth as needed. No current facility-administered medications on file prior to visit. CARDIOLOGY STUDIES TO DATE:  No specialty comments available. Chief Complaint   Patient presents with    Follow-up     HPI :  He is doing great. Blood pressure is well controlled and he is having no cardiac type symptoms. His he still has not gotten to the endocrinologist but is primary care got some thyroid blood work on him the other day and things are not too far out of line.   CARDIAC ROS:   negative for chest pain, dyspnea, palpitations, syncope, orthopnea, paroxysmal nocturnal dyspnea, exertional chest pressure/discomfort, claudication, lower extremity edema    Family History   Problem Relation Age of Onset    Breast Cancer Mother     Hypertension Father     Breast Cancer Maternal Aunt     Cancer Maternal Aunt         breast ca    Cancer Maternal Uncle         lung ca    Diabetes Maternal Grandmother     Cancer Maternal Grandfather         prostate and brain CA    Cancer Paternal Grandmother         lung ca    Thyroid Disease Paternal Grandfather     Myasthenia Gravis Paternal Grandfather     Cancer Maternal Uncle         colon ca       Past Medical History:   Diagnosis Date    Graves disease        GENERAL ROS:  A comprehensive review of systems was negative except for that written in the HPI. Visit Vitals  /80   Pulse 62   Resp 16   Ht 5' 9\" (1.753 m)   Wt 175 lb (79.4 kg)   SpO2 96%   BMI 25.84 kg/m²       Wt Readings from Last 3 Encounters:   05/06/22 175 lb (79.4 kg)   04/11/22 176 lb (79.8 kg)   10/22/21 186 lb (84.4 kg)            BP Readings from Last 3 Encounters:   05/06/22 116/80   04/11/22 (!) 140/72   11/03/21 124/80       PHYSICAL EXAM  General appearance: alert, cooperative, no distress, appears stated age  Neurologic: Alert and oriented X 3  Neck: supple, symmetrical, trachea midline, no adenopathy, no carotid bruit and no JVD  Lungs: clear to auscultation bilaterally  Heart: regular rate and rhythm, S1, S2 normal, no murmur, click, rub or gallop  Extremities: extremities normal, atraumatic, no cyanosis or edema    Lab Results   Component Value Date/Time    Cholesterol, total 229 (H) 04/11/2022 02:29 PM    Cholesterol, total 185 06/17/2019 10:41 AM    HDL Cholesterol 39 (L) 04/11/2022 02:29 PM    HDL Cholesterol 37 (L) 06/17/2019 10:41 AM    LDL, calculated 154 (H) 04/11/2022 02:29 PM    LDL, calculated 110 (H) 06/17/2019 10:41 AM    Triglyceride 194 (H) 04/11/2022 02:29 PM    Triglyceride 192 (H) 06/17/2019 10:41 AM     ASSESSMENT :      He is stable and asymptomatic, well compensated on a good medical regimen and needs no cardiac testing at this time. current treatment plan is effective, no change in therapy  lab results and schedule of future lab studies reviewed with patient  reviewed diet, exercise and weight control    Encounter Diagnoses   Name Primary?     Essential hypertension Yes    Graves disease     Palpitations     Other chest pain      No orders of the defined types were placed in this encounter. Follow-up and Dispositions    · Return in about 6 months (around 11/6/2022). Malathi Mccall MD  5/6/2022  Please note that this dictation was completed with [facility], the computer voice recognition software. Quite often unanticipated grammatical, syntax, homophones, and other interpretive errors are inadvertently transcribed by the computer software. Please disregard these errors. Please excuse any errors that have escaped final proofreading. Thank you.

## 2022-05-17 DIAGNOSIS — I10 ESSENTIAL HYPERTENSION: ICD-10-CM

## 2022-05-17 DIAGNOSIS — R00.2 PALPITATIONS: ICD-10-CM

## 2022-05-18 RX ORDER — PROPRANOLOL HYDROCHLORIDE 120 MG/1
CAPSULE, EXTENDED RELEASE ORAL
Qty: 90 CAPSULE | Refills: 1 | Status: SHIPPED | OUTPATIENT
Start: 2022-05-18

## 2022-05-18 NOTE — TELEPHONE ENCOUNTER
Requested Prescriptions     Signed Prescriptions Disp Refills    propranolol LA (INDERAL LA) 120 mg SR capsule 90 Capsule 1     Sig: TAKE 1 CAPSULE BY MOUTH EVERY DAY     Authorizing Provider: Lida Jane     Ordering User: Kolton Rater    Per Dr. Tal Saldana verbal orders

## 2022-10-11 ENCOUNTER — OFFICE VISIT (OUTPATIENT)
Dept: INTERNAL MEDICINE CLINIC | Age: 37
End: 2022-10-11
Payer: MEDICAID

## 2022-10-11 VITALS
SYSTOLIC BLOOD PRESSURE: 120 MMHG | HEIGHT: 69 IN | OXYGEN SATURATION: 96 % | HEART RATE: 77 BPM | BODY MASS INDEX: 26.51 KG/M2 | DIASTOLIC BLOOD PRESSURE: 86 MMHG | RESPIRATION RATE: 12 BRPM | WEIGHT: 179 LBS

## 2022-10-11 DIAGNOSIS — R13.10 DYSPHAGIA, UNSPECIFIED TYPE: Primary | ICD-10-CM

## 2022-10-11 DIAGNOSIS — R79.89 LOW TSH LEVEL: ICD-10-CM

## 2022-10-11 DIAGNOSIS — I10 ESSENTIAL HYPERTENSION: ICD-10-CM

## 2022-10-11 PROCEDURE — 99213 OFFICE O/P EST LOW 20 MIN: CPT | Performed by: INTERNAL MEDICINE

## 2022-10-11 NOTE — PROGRESS NOTES
Health Maintenance Due   Topic Date Due    DTaP/Tdap/Td series (1 - Tdap) Never done    COVID-19 Vaccine (3 - Booster for Pfizer series) 09/30/2021    Flu Vaccine (1) Never done       Chief Complaint   Patient presents with    Follow Up Chronic Condition    Kidney Stone     Pt states he believes he has a kidney stone forming    Dysphagia     Pt has seen an endocrinologist for graves disease and she didn't believe that the Pt thyroid would be the issue. 1. Have you been to the ER, urgent care clinic since your last visit? Hospitalized since your last visit? No    2. Have you seen or consulted any other health care providers outside of the [address] [place] [address] since your last visit? Include any pap smears or colon screening. No    3) Do you have an Advance Directive on file? no    4) Are you interested in receiving information on Advance Directives? NO      Patient is accompanied by self I have received verbal consent from Michael Blanchard to discuss any/all medical information while they are present in the room.

## 2022-10-11 NOTE — PROGRESS NOTES
HISTORY OF PRESENT ILLNESS  Ashish Rios is a 40 y.o. male. Patient was seen for a follow up. Reports that he saw endocrine at [facility]. TSH was still, but since his T3 and T4 were ok they did not start treatment. Also reports that sensation of food getting stuck. Has been having this issue for months. Drinks are ok. Often has to cough it up or drink fluids to flush. HTN: stable. Remains on BP medication and watches his salt. Is seeing psych NP still. Depression and his OCD have increased. Is taking to her about this. Will need to get his CMP and lipids done soon. Visit Vitals  /86 (BP 1 Location: Left upper arm, BP Patient Position: Sitting, BP Cuff Size: Adult)   Pulse 77   Resp 12   Ht 5' 9\" (1.753 m)   Wt 179 lb (81.2 kg)   SpO2 96%   BMI 26.43 kg/m²     Past Medical History:   Diagnosis Date    Graves disease      History reviewed. No pertinent surgical history. Family History   Problem Relation Age of Onset    Breast Cancer Mother     Hypertension Father     Breast Cancer Maternal Aunt     Cancer Maternal Aunt         breast ca    Cancer Maternal Uncle         lung ca    Diabetes Maternal Grandmother     Cancer Maternal Grandfather         prostate and brain CA    Cancer Paternal Grandmother         lung ca    Thyroid Disease Paternal Grandfather     Myasthenia Gravis Paternal Grandfather     Cancer Maternal Uncle         colon ca     Outpatient Encounter Medications as of 10/11/2022   Medication Sig Dispense Refill    propranolol LA (INDERAL LA) 120 mg SR capsule TAKE 1 CAPSULE BY MOUTH EVERY DAY 90 Capsule 1    lisinopriL (PRINIVIL, ZESTRIL) 20 mg tablet Take 1 Tablet by mouth daily. 90 Tablet 2    busPIRone (BUSPAR) 5 mg tablet Take 5 mg by mouth two (2) times a day. armodafinil 50 mg tab TAKE 1 TABLET BY MOUTH EVERY DAY AS NEEDED  0    FLUoxetine (PROZAC) 20 mg capsule Take 20 mg by mouth daily. 2    hydrOXYzine pamoate (VISTARIL) 25 mg capsule Take 25 mg by mouth as needed.   1     No facility-administered encounter medications on file as of 10/11/2022. HPI    Review of Systems   Constitutional: Negative. HENT:          Food stuck in throat    Respiratory: Negative. Cardiovascular: Negative. Gastrointestinal: Negative. Neurological: Negative. Psychiatric/Behavioral:  Positive for depression. Physical Exam  Vitals and nursing note reviewed. HENT:      Head:        Right Ear: Tympanic membrane normal.      Left Ear: Tympanic membrane normal.      Mouth/Throat:      Lips: No lesions. Pharynx: Oropharynx is clear. No posterior oropharyngeal erythema. Neck:      Vascular: No carotid bruit. Cardiovascular:      Rate and Rhythm: Normal rate and regular rhythm. Pulmonary:      Effort: Pulmonary effort is normal.      Breath sounds: Normal breath sounds. Abdominal:      General: Bowel sounds are normal. There is no distension. Palpations: Abdomen is soft. Musculoskeletal:         General: Normal range of motion. Cervical back: Neck supple. No tenderness. Skin:     General: Skin is warm. Neurological:      Mental Status: He is alert and oriented to person, place, and time. Psychiatric:         Mood and Affect: Mood is anxious. ASSESSMENT and PLAN  Diagnoses and all orders for this visit:    1. Dysphagia, unspecified type  -     REFERRAL TO ENT-OTOLARYNGOLOGY    2. Essential hypertension        -    CMP and Lipid follow up on next draw. DASH diet   3. Low TSH level        -    follow up with endocrine as planned     Follow-up and Dispositions    Return in about 6 months (around 4/11/2023).        lab results and schedule of future lab studies reviewed with patient  reviewed diet, exercise and weight control  cardiovascular risk and specific lipid/LDL goals reviewed  reviewed medications and side effects in detail

## 2022-11-09 ENCOUNTER — OFFICE VISIT (OUTPATIENT)
Dept: CARDIOLOGY CLINIC | Age: 37
End: 2022-11-09
Payer: MEDICAID

## 2022-11-09 VITALS
HEART RATE: 73 BPM | RESPIRATION RATE: 17 BRPM | DIASTOLIC BLOOD PRESSURE: 78 MMHG | WEIGHT: 193 LBS | SYSTOLIC BLOOD PRESSURE: 116 MMHG | HEIGHT: 69 IN | BODY MASS INDEX: 28.58 KG/M2 | OXYGEN SATURATION: 100 %

## 2022-11-09 DIAGNOSIS — I10 ESSENTIAL HYPERTENSION: Primary | ICD-10-CM

## 2022-11-09 DIAGNOSIS — R07.89 OTHER CHEST PAIN: ICD-10-CM

## 2022-11-09 DIAGNOSIS — E05.00 GRAVES DISEASE: ICD-10-CM

## 2022-11-09 DIAGNOSIS — R00.2 PALPITATIONS: ICD-10-CM

## 2022-11-09 PROCEDURE — 99213 OFFICE O/P EST LOW 20 MIN: CPT | Performed by: SPECIALIST

## 2022-11-09 PROCEDURE — 3074F SYST BP LT 130 MM HG: CPT | Performed by: SPECIALIST

## 2022-11-09 PROCEDURE — 3078F DIAST BP <80 MM HG: CPT | Performed by: SPECIALIST

## 2022-11-09 RX ORDER — PROPRANOLOL HYDROCHLORIDE 120 MG/1
120 CAPSULE, EXTENDED RELEASE ORAL DAILY
Qty: 90 CAPSULE | Refills: 2 | Status: SHIPPED | OUTPATIENT
Start: 2022-11-09

## 2022-11-09 RX ORDER — LISINOPRIL 20 MG/1
20 TABLET ORAL DAILY
Qty: 90 TABLET | Refills: 2 | Status: SHIPPED | OUTPATIENT
Start: 2022-11-09

## 2022-11-09 NOTE — PROGRESS NOTES
Rm    No chief complaint on file. Visit Vitals  /78 (BP 1 Location: Left upper arm, BP Patient Position: Sitting, BP Cuff Size: Adult)   Pulse 73   Resp 17   Ht 5' 9\" (1.753 m)   Wt 193 lb (87.5 kg)   SpO2 100%   BMI 28.50 kg/m²        1. Have you been to the ER, urgent care clinic since your last visit? Hospitalized since your last visit? No    2. Have you seen or consulted any other health care providers outside of the [address] [place] [address] since your last visit? Include any pap smears or colon screening. No     Health Maintenance Due   Topic Date Due    DTaP/Tdap/Td series (1 - Tdap) Never done    COVID-19 Vaccine (3 - Booster for Pfizer series) 06/25/2021    Flu Vaccine (1) Never done        3 most recent PHQ Screens 11/9/2022   PHQ Not Done -   Little interest or pleasure in doing things Not at all   Feeling down, depressed, irritable, or hopeless Not at all   Total Score PHQ 2 0        No flowsheet data found. No flowsheet data found.

## 2022-11-09 NOTE — PROGRESS NOTES
HISTORY OF PRESENT ILLNESS  Ferdinand Sabillon is a 40 y.o. male     SUMMARY:   Problem List  Date Reviewed: 11/9/2022            Codes Class Noted    History of kidney stones ICD-10-CM: Z87.442  ICD-9-CM: V13.01  4/11/2022        Graves disease ICD-10-CM: E05.00  ICD-9-CM: 242.00  4/11/2022        Essential hypertension ICD-10-CM: I10  ICD-9-CM: 401.9  6/11/2019        Other chest pain ICD-10-CM: R07.89  ICD-9-CM: 786.59  6/11/2019           Current Outpatient Medications on File Prior to Visit   Medication Sig    propranolol LA (INDERAL LA) 120 mg SR capsule TAKE 1 CAPSULE BY MOUTH EVERY DAY    lisinopriL (PRINIVIL, ZESTRIL) 20 mg tablet Take 1 Tablet by mouth daily. busPIRone (BUSPAR) 5 mg tablet Take 5 mg by mouth two (2) times a day. armodafinil 50 mg tab TAKE 1 TABLET BY MOUTH EVERY DAY AS NEEDED    FLUoxetine (PROZAC) 20 mg capsule Take 20 mg by mouth daily. hydrOXYzine pamoate (VISTARIL) 25 mg capsule Take 25 mg by mouth as needed. No current facility-administered medications on file prior to visit. CARDIOLOGY STUDIES TO DATE:  No specialty comments available. Chief Complaint   Patient presents with    Follow-up     HPI :  He is doing well with no cardiac complaints or problems with his medications. Blood pressures well controlled. He still does a lot of heavy labor at work without any worrisome symptoms. 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   CARDIAC ROS:   negative for chest pain, dyspnea, palpitations, syncope, orthopnea, paroxysmal nocturnal dyspnea, exertional chest pressure/discomfort, claudication, lower extremity edema    Family History   Problem Relation Age of Onset    Breast Cancer Mother     Hypertension Father     Breast Cancer Maternal Aunt     Cancer Maternal Aunt         breast ca    Cancer Maternal Uncle         lung ca    Diabetes Maternal Grandmother     Cancer Maternal Grandfather         prostate and brain CA    Cancer Paternal Grandmother         lung ca    Thyroid Disease Paternal Grandfather     Myasthenia Gravis Paternal Grandfather     Cancer Maternal Uncle         colon ca       Past Medical History:   Diagnosis Date    Graves disease        GENERAL ROS:  A comprehensive review of systems was negative except for that written in the HPI. Visit Vitals  /78 (BP 1 Location: Left upper arm, BP Patient Position: Sitting, BP Cuff Size: Adult)   Pulse 73   Resp 17   Ht 5' 9\" (1.753 m)   Wt 193 lb (87.5 kg)   SpO2 100%   BMI 28.50 kg/m²       Wt Readings from Last 3 Encounters:   11/09/22 193 lb (87.5 kg)   10/11/22 179 lb (81.2 kg)   05/06/22 175 lb (79.4 kg)            BP Readings from Last 3 Encounters:   11/09/22 116/78   10/11/22 120/86   05/06/22 116/80       PHYSICAL EXAM  General appearance: alert, cooperative, no distress, appears stated age  Neurologic: Alert and oriented X 3  Neck: supple, symmetrical, trachea midline, no adenopathy, no carotid bruit, and no JVD  Lungs: clear to auscultation bilaterally  Heart: regular rate and rhythm, S1, S2 normal, no murmur, click, rub or gallop  Extremities: extremities normal, atraumatic, no cyanosis or edema    Lab Results   Component Value Date/Time    Cholesterol, total 229 (H) 04/11/2022 02:29 PM    Cholesterol, total 185 06/17/2019 10:41 AM    HDL Cholesterol 39 (L) 04/11/2022 02:29 PM    HDL Cholesterol 37 (L) 06/17/2019 10:41 AM    LDL, calculated 154 (H) 04/11/2022 02:29 PM    LDL, calculated 110 (H) 06/17/2019 10:41 AM    Triglyceride 194 (H) 04/11/2022 02:29 PM    Triglyceride 192 (H) 06/17/2019 10:41 AM     ASSESSMENT :      He is stable and asymptomatic, well compensated on a good medical regimen and needs no cardiac testing at this time.   current treatment plan is effective, no change in therapy  lab results and schedule of future lab studies reviewed with patient  reviewed diet, exercise and weight control    Encounter Diagnoses   Name Primary? Essential hypertension Yes    Graves disease      No orders of the defined types were placed in this encounter. Follow-up and Dispositions    Return in about 6 months (around 5/9/2023). Ernie Olson MD  11/9/2022  Please note that this dictation was completed with [employer], the computer voice recognition software. Quite often unanticipated grammatical, syntax, homophones, and other interpretive errors are inadvertently transcribed by the computer software. Please disregard these errors. Please excuse any errors that have escaped final proofreading. Thank you.

## 2023-05-22 ENCOUNTER — OFFICE VISIT (OUTPATIENT)
Age: 38
End: 2023-05-22
Payer: MEDICAID

## 2023-05-22 VITALS
OXYGEN SATURATION: 96 % | HEART RATE: 69 BPM | WEIGHT: 180 LBS | BODY MASS INDEX: 26.66 KG/M2 | SYSTOLIC BLOOD PRESSURE: 110 MMHG | DIASTOLIC BLOOD PRESSURE: 60 MMHG | HEIGHT: 69 IN | RESPIRATION RATE: 16 BRPM

## 2023-05-22 DIAGNOSIS — I10 ESSENTIAL HYPERTENSION: Primary | ICD-10-CM

## 2023-05-22 PROCEDURE — 99213 OFFICE O/P EST LOW 20 MIN: CPT | Performed by: SPECIALIST

## 2023-05-22 PROCEDURE — 3074F SYST BP LT 130 MM HG: CPT | Performed by: SPECIALIST

## 2023-05-22 PROCEDURE — 3078F DIAST BP <80 MM HG: CPT | Performed by: SPECIALIST

## 2023-05-22 RX ORDER — HYDROXYZINE PAMOATE 25 MG/1
25 CAPSULE ORAL PRN
COMMUNITY
Start: 2019-08-14

## 2023-05-22 RX ORDER — LISINOPRIL 20 MG/1
20 TABLET ORAL DAILY
COMMUNITY
Start: 2023-05-15

## 2023-05-22 RX ORDER — FLUOXETINE HYDROCHLORIDE 20 MG/1
20 CAPSULE ORAL DAILY
COMMUNITY
Start: 2019-08-14

## 2023-05-22 RX ORDER — BUSPIRONE HYDROCHLORIDE 5 MG/1
5 TABLET ORAL 2 TIMES DAILY
COMMUNITY
Start: 2021-03-28

## 2023-05-22 RX ORDER — ARMODAFINIL 50 MG/1
1 TABLET ORAL DAILY PRN
COMMUNITY
Start: 2019-11-19

## 2023-05-22 NOTE — PROGRESS NOTES
HISTORY OF PRESENT ILLNESS  Lex Cooks is a 40 y.o. male     SUMMARY:   Patient Active Problem List   Diagnosis    Essential hypertension    Other chest pain    History of kidney stones    Graves disease       Current Outpatient Medications   Medication Sig Dispense Refill    propranolol (INDERAL XL) 120 MG extended release capsule Take 1 capsule by mouth daily      hydrOXYzine pamoate (VISTARIL) 25 MG capsule Take 1 capsule by mouth as needed      FLUoxetine (PROZAC) 20 MG capsule Take 1 capsule by mouth daily      busPIRone (BUSPAR) 5 MG tablet Take 1 tablet by mouth 2 times daily      Armodafinil 50 MG TABS Take 50 mg by mouth daily as needed. ALBUTEROL IN Inhale into the lungs      lisinopril (PRINIVIL;ZESTRIL) 20 MG tablet Take 1 tablet by mouth daily       No current facility-administered medications for this visit. CARDIOLOGY STUDIES TO DATE:  No specialty comments available. Chief Complaint   Patient presents with    Follow-up       HPI :  He is doing great with no cardiac complaints or problems with the medication. He does not exercise but he is very active at work. Blood pressure is well controlled. CARDIAC ROS:   negative for chest pain, dyspnea, fatigue, lower extremity edema, orthopnea, palpitations, paroxysmal nocturnal dyspnea, and syncope    Family History   Problem Relation Age of Onset    Diabetes Maternal Grandmother     Cancer Maternal Grandfather         prostate and brain CA    Cancer Paternal Grandmother         lung ca    Breast Cancer Mother     Breast Cancer Maternal Aunt     Hypertension Father     Cancer Maternal Uncle         colon ca    Cancer Maternal Uncle         lung ca    Cancer Maternal Aunt         breast ca    Thyroid Disease Paternal Grandfather        Past Medical History:   Diagnosis Date    Graves disease        GENERAL ROS:  A comprehensive review of systems was negative except for what was noted in the HPI.     /60   Pulse 69   Resp 16

## 2023-05-29 DIAGNOSIS — R00.2 PALPITATIONS: ICD-10-CM

## 2023-05-29 DIAGNOSIS — I10 ESSENTIAL (PRIMARY) HYPERTENSION: ICD-10-CM

## 2023-05-30 RX ORDER — PROPRANOLOL HYDROCHLORIDE 120 MG/1
CAPSULE, EXTENDED RELEASE ORAL
Qty: 90 CAPSULE | Refills: 2 | Status: SHIPPED | OUTPATIENT
Start: 2023-05-30

## 2023-05-30 NOTE — TELEPHONE ENCOUNTER
Requested Prescriptions     Signed Prescriptions Disp Refills    propranolol (INDERAL LA) 120 MG extended release capsule 90 capsule 2     Sig: TAKE 1 CAPSULE BY MOUTH EVERY DAY     Authorizing Provider: Madhu Ivy     Ordering User: Daniel Puckett    Per Dr. Josue Baltazar verbal order.

## 2023-11-14 DIAGNOSIS — R00.2 PALPITATIONS: ICD-10-CM

## 2023-11-14 DIAGNOSIS — I10 ESSENTIAL (PRIMARY) HYPERTENSION: ICD-10-CM

## 2023-11-14 RX ORDER — LISINOPRIL 20 MG/1
20 TABLET ORAL DAILY
Qty: 30 TABLET | Refills: 0 | Status: SHIPPED | OUTPATIENT
Start: 2023-11-14

## 2023-11-21 ENCOUNTER — TELEPHONE (OUTPATIENT)
Age: 38
End: 2023-11-21

## 2023-11-21 DIAGNOSIS — I10 ESSENTIAL (PRIMARY) HYPERTENSION: ICD-10-CM

## 2023-11-21 DIAGNOSIS — R00.2 PALPITATIONS: ICD-10-CM

## 2023-11-21 RX ORDER — PROPRANOLOL HYDROCHLORIDE 120 MG/1
CAPSULE, EXTENDED RELEASE ORAL
Qty: 30 CAPSULE | Refills: 1 | Status: SHIPPED | OUTPATIENT
Start: 2023-11-21

## 2023-11-21 NOTE — TELEPHONE ENCOUNTER
Requested Prescriptions     Signed Prescriptions Disp Refills    propranolol (INDERAL LA) 120 MG extended release capsule 30 capsule 1     Sig: TAKE 1 CAPSULE BY MOUTH EVERY DAY     Authorizing Provider: Nakita Mancilla     Ordering User: Connie Hodgkins    Per Dr. Jessica Wood verbal order.

## 2023-12-04 ENCOUNTER — OFFICE VISIT (OUTPATIENT)
Age: 38
End: 2023-12-04
Payer: MEDICAID

## 2023-12-04 VITALS
DIASTOLIC BLOOD PRESSURE: 80 MMHG | BODY MASS INDEX: 28.08 KG/M2 | WEIGHT: 189.6 LBS | OXYGEN SATURATION: 97 % | SYSTOLIC BLOOD PRESSURE: 102 MMHG | HEIGHT: 69 IN | HEART RATE: 73 BPM

## 2023-12-04 DIAGNOSIS — R07.89 OTHER CHEST PAIN: ICD-10-CM

## 2023-12-04 DIAGNOSIS — I10 ESSENTIAL HYPERTENSION: Primary | ICD-10-CM

## 2023-12-04 PROCEDURE — 3078F DIAST BP <80 MM HG: CPT | Performed by: SPECIALIST

## 2023-12-04 PROCEDURE — 99213 OFFICE O/P EST LOW 20 MIN: CPT | Performed by: SPECIALIST

## 2023-12-04 PROCEDURE — 3074F SYST BP LT 130 MM HG: CPT | Performed by: SPECIALIST

## 2023-12-04 ASSESSMENT — PATIENT HEALTH QUESTIONNAIRE - PHQ9
2. FEELING DOWN, DEPRESSED OR HOPELESS: 0
SUM OF ALL RESPONSES TO PHQ9 QUESTIONS 1 & 2: 0
SUM OF ALL RESPONSES TO PHQ QUESTIONS 1-9: 0
1. LITTLE INTEREST OR PLEASURE IN DOING THINGS: 0

## 2023-12-08 DIAGNOSIS — R00.2 PALPITATIONS: ICD-10-CM

## 2023-12-08 DIAGNOSIS — I10 ESSENTIAL (PRIMARY) HYPERTENSION: ICD-10-CM

## 2023-12-08 RX ORDER — LISINOPRIL 20 MG/1
20 TABLET ORAL DAILY
Qty: 90 TABLET | Refills: 3 | Status: SHIPPED | OUTPATIENT
Start: 2023-12-08

## 2023-12-08 NOTE — TELEPHONE ENCOUNTER
Requested Prescriptions     Signed Prescriptions Disp Refills    lisinopril (PRINIVIL;ZESTRIL) 20 MG tablet 90 tablet 3     Sig: Take 1 tablet by mouth daily     Authorizing Provider: Gladys Estrada     Ordering User: Chandni Schroeder    Per Dr. Yuridia Lemos verbal order.

## 2024-07-01 ENCOUNTER — OFFICE VISIT (OUTPATIENT)
Age: 39
End: 2024-07-01
Payer: MEDICAID

## 2024-07-01 VITALS
OXYGEN SATURATION: 96 % | WEIGHT: 183 LBS | SYSTOLIC BLOOD PRESSURE: 118 MMHG | BODY MASS INDEX: 27.11 KG/M2 | HEIGHT: 69 IN | HEART RATE: 94 BPM | DIASTOLIC BLOOD PRESSURE: 78 MMHG

## 2024-07-01 DIAGNOSIS — R00.2 PALPITATIONS: ICD-10-CM

## 2024-07-01 DIAGNOSIS — E05.00 GRAVES DISEASE: ICD-10-CM

## 2024-07-01 DIAGNOSIS — I10 ESSENTIAL HYPERTENSION: Primary | ICD-10-CM

## 2024-07-01 DIAGNOSIS — R07.89 OTHER CHEST PAIN: ICD-10-CM

## 2024-07-01 PROCEDURE — 3078F DIAST BP <80 MM HG: CPT | Performed by: SPECIALIST

## 2024-07-01 PROCEDURE — 3074F SYST BP LT 130 MM HG: CPT | Performed by: SPECIALIST

## 2024-07-01 PROCEDURE — 99213 OFFICE O/P EST LOW 20 MIN: CPT | Performed by: SPECIALIST

## 2024-07-01 ASSESSMENT — PATIENT HEALTH QUESTIONNAIRE - PHQ9
2. FEELING DOWN, DEPRESSED OR HOPELESS: NOT AT ALL
SUM OF ALL RESPONSES TO PHQ QUESTIONS 1-9: 0
SUM OF ALL RESPONSES TO PHQ QUESTIONS 1-9: 0
1. LITTLE INTEREST OR PLEASURE IN DOING THINGS: NOT AT ALL
SUM OF ALL RESPONSES TO PHQ QUESTIONS 1-9: 0
SUM OF ALL RESPONSES TO PHQ QUESTIONS 1-9: 0
SUM OF ALL RESPONSES TO PHQ9 QUESTIONS 1 & 2: 0

## 2024-07-01 NOTE — PROGRESS NOTES
05/22/23 81.6 kg (180 lb)            BP Readings from Last 3 Encounters:   07/01/24 118/78   12/04/23 102/80   05/22/23 110/60       PHYSICAL EXAM  General appearance: alert, appears stated age, and cooperative  Neck: no adenopathy, no carotid bruit, no JVD, and supple, symmetrical, trachea midline  Lungs: clear to auscultation bilaterally  Heart: regular rate and rhythm, S1, S2 normal, no murmur, click, rub or gallop  Extremities: extremities normal, atraumatic, no cyanosis or edema      Lab Results   Component Value Date    CHOL 229 (H) 04/11/2022    TRIG 194 (H) 04/11/2022    HDL 39 (L) 04/11/2022    VLDL 36 04/11/2022      Lab Results   Component Value Date     04/11/2022    K 4.3 04/11/2022     04/11/2022    CO2 17 (L) 04/11/2022    BUN 5 (L) 04/11/2022    CREATININE 0.91 04/11/2022    GLUCOSE 103 (H) 04/11/2022    CALCIUM 9.1 04/11/2022    BILITOT 0.4 04/11/2022    ALKPHOS 112 04/11/2022    AST 24 04/11/2022    ALT 38 04/11/2022    LABGLOM 112 04/11/2022    AGRATIO 1.6 04/11/2022       Current Outpatient Medications   Medication Sig Dispense Refill    lisinopril (PRINIVIL;ZESTRIL) 20 MG tablet Take 1 tablet by mouth daily 90 tablet 3    hydrOXYzine pamoate (VISTARIL) 25 MG capsule Take 1 capsule by mouth as needed      FLUoxetine (PROZAC) 20 MG capsule Take 1 capsule by mouth daily      busPIRone (BUSPAR) 5 MG tablet Take 1 tablet by mouth 2 times daily      Armodafinil 50 MG TABS Take 50 mg by mouth 2 times daily.      ALBUTEROL IN Inhale into the lungs       No current facility-administered medications for this visit.       ASSESSMENT & PLAN:      He is stable and asymptomatic, well compensated on a good medical regimen and needs no cardiac testing at this time.  We will continue his lisinopril.    Current treatment plan is effective,reviewed diet, exercise and weight control     1. Essential hypertension  2. Other chest pain  3. Palpitations  4. Graves disease       No future appointments.

## 2024-12-26 DIAGNOSIS — I10 ESSENTIAL (PRIMARY) HYPERTENSION: ICD-10-CM

## 2024-12-26 DIAGNOSIS — R00.2 PALPITATIONS: ICD-10-CM

## 2024-12-26 RX ORDER — LISINOPRIL 20 MG/1
20 TABLET ORAL DAILY
Qty: 90 TABLET | Refills: 0 | Status: SHIPPED | OUTPATIENT
Start: 2024-12-26

## 2024-12-26 NOTE — TELEPHONE ENCOUNTER
Refill Request Received for the Following Medication     Requested Prescriptions     Pending Prescriptions Disp Refills    lisinopril (PRINIVIL;ZESTRIL) 20 MG tablet [Pharmacy Med Name: LISINOPRIL 20 MG TABLET] 90 tablet 3     Sig: TAKE 1 TABLET BY MOUTH EVERY DAY       Last Prescribed:12-    Last Appointment With Me:  7/1/2024     Future Appointments:  Future Appointments   Date Time Provider Department Center   1/2/2025  2:20 PM Sukh Kebede III, MD [facility]

## 2025-01-02 ENCOUNTER — OFFICE VISIT (OUTPATIENT)
Age: 40
End: 2025-01-02
Payer: MEDICAID

## 2025-01-02 VITALS
DIASTOLIC BLOOD PRESSURE: 80 MMHG | SYSTOLIC BLOOD PRESSURE: 120 MMHG | BODY MASS INDEX: 28.44 KG/M2 | HEART RATE: 94 BPM | WEIGHT: 192 LBS | OXYGEN SATURATION: 97 % | HEIGHT: 69 IN

## 2025-01-02 DIAGNOSIS — I10 ESSENTIAL HYPERTENSION: Primary | ICD-10-CM

## 2025-01-02 PROCEDURE — 99213 OFFICE O/P EST LOW 20 MIN: CPT | Performed by: SPECIALIST

## 2025-01-02 PROCEDURE — 3079F DIAST BP 80-89 MM HG: CPT | Performed by: SPECIALIST

## 2025-01-02 PROCEDURE — 3074F SYST BP LT 130 MM HG: CPT | Performed by: SPECIALIST

## 2025-01-02 ASSESSMENT — PATIENT HEALTH QUESTIONNAIRE - PHQ9
SUM OF ALL RESPONSES TO PHQ QUESTIONS 1-9: 2
SUM OF ALL RESPONSES TO PHQ QUESTIONS 1-9: 2
SUM OF ALL RESPONSES TO PHQ9 QUESTIONS 1 & 2: 2
SUM OF ALL RESPONSES TO PHQ QUESTIONS 1-9: 2
SUM OF ALL RESPONSES TO PHQ QUESTIONS 1-9: 2
2. FEELING DOWN, DEPRESSED OR HOPELESS: SEVERAL DAYS
1. LITTLE INTEREST OR PLEASURE IN DOING THINGS: SEVERAL DAYS

## 2025-01-02 NOTE — PROGRESS NOTES
07/01/24 83 kg (183 lb)   12/04/23 86 kg (189 lb 9.6 oz)            BP Readings from Last 3 Encounters:   01/02/25 120/80   07/01/24 118/78   12/04/23 102/80       PHYSICAL EXAM  General appearance: alert, appears stated age, and cooperative  Neck: no adenopathy, no carotid bruit, no JVD, and supple, symmetrical, trachea midline  Lungs: clear to auscultation bilaterally  Heart: regular rate and rhythm, S1, S2 normal, no murmur, click, rub or gallop  Extremities: extremities normal, atraumatic, no cyanosis or edema      Lab Results   Component Value Date    CHOL 229 (H) 04/11/2022    TRIG 194 (H) 04/11/2022    HDL 39 (L) 04/11/2022    VLDL 36 04/11/2022    No results found for: \"LDLC\"   Lab Results   Component Value Date     04/11/2022    K 4.3 04/11/2022     04/11/2022    CO2 17 (L) 04/11/2022    BUN 5 (L) 04/11/2022    CREATININE 0.91 04/11/2022    GLUCOSE 103 (H) 04/11/2022    CALCIUM 9.1 04/11/2022    BILITOT 0.4 04/11/2022    ALKPHOS 112 04/11/2022    AST 24 04/11/2022    ALT 38 04/11/2022    LABGLOM 112 04/11/2022    AGRATIO 1.6 04/11/2022       Current Outpatient Medications   Medication Sig Dispense Refill    Armodafinil 50 MG TABS Take 50 mg by mouth 2 times daily.      ALBUTEROL IN Inhale into the lungs       No current facility-administered medications for this visit.       ASSESSMENT & PLAN:      He is stable and asymptomatic, well compensated on a good medical regimen and needs no cardiac testing at this time.  I encouraged him to try remember to take his lisinopril every day and if he starts having any symptomatic low pressures we can always decrease the dose    Current treatment plan is effective,reviewed diet, exercise and weight control     1. Essential hypertension       Future Appointments   Date Time Provider Department Center   7/2/2025 11:20 AM Sukh Kebede III, MD [facility]        Sukh Kebede MD  1/2/2025  Please note that this dictation was completed with juanito Price

## 2025-03-21 ENCOUNTER — HOSPITAL ENCOUNTER (EMERGENCY)
Facility: HOSPITAL | Age: 40
Discharge: HOME | End: 2025-03-21
Attending: EMERGENCY MEDICINE
Payer: COMMERCIAL

## 2025-03-21 ENCOUNTER — APPOINTMENT (OUTPATIENT)
Facility: HOSPITAL | Age: 40
End: 2025-03-21
Payer: COMMERCIAL

## 2025-03-21 VITALS
HEIGHT: 69 IN | HEART RATE: 80 BPM | RESPIRATION RATE: 18 BRPM | DIASTOLIC BLOOD PRESSURE: 132 MMHG | WEIGHT: 193 LBS | SYSTOLIC BLOOD PRESSURE: 196 MMHG | OXYGEN SATURATION: 96 % | TEMPERATURE: 98.3 F | BODY MASS INDEX: 28.58 KG/M2

## 2025-03-21 DIAGNOSIS — S43.409A SPRAIN OF SHOULDER, UNSPECIFIED LATERALITY, UNSPECIFIED SHOULDER SPRAIN TYPE, INITIAL ENCOUNTER: ICD-10-CM
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
## 2025-03-21 PROCEDURE — 6360000002 HC RX W HCPCS: Performed by: EMERGENCY MEDICINE

## 2025-03-21 PROCEDURE — 73030 X-RAY EXAM OF SHOULDER: CPT

## 2025-03-21 PROCEDURE — 73110 X-RAY EXAM OF WRIST: CPT

## 2025-03-21 PROCEDURE — 71045 X-RAY EXAM CHEST 1 VIEW: CPT

## 2025-03-21 PROCEDURE — 73080 X-RAY EXAM OF ELBOW: CPT

## 2025-03-21 PROCEDURE — 96372 THER/PROPH/DIAG INJ SC/IM: CPT

## 2025-03-21 PROCEDURE — 99284 EMERGENCY DEPT VISIT MOD MDM: CPT

## 2025-03-21 RX ORDER — CYCLOBENZAPRINE HCL 10 MG
10 TABLET ORAL 3 TIMES DAILY PRN
Qty: 21 TABLET | Refills: 0 | Status: SHIPPED | OUTPATIENT
Start: 2025-03-21 | End: 2025-03-31

## 2025-03-21 RX ORDER — IBUPROFEN 600 MG/1
600 TABLET, FILM COATED ORAL EVERY 8 HOURS PRN
Qty: 30 TABLET | Refills: 0 | Status: SHIPPED | OUTPATIENT
Start: 2025-03-21

## 2025-03-21 RX ORDER — KETOROLAC TROMETHAMINE 30 MG/ML
30 INJECTION, SOLUTION INTRAMUSCULAR; INTRAVENOUS
Status: DISCONTINUED | OUTPATIENT
Start: 2025-03-21 | End: 2025-03-21

## 2025-03-21 RX ORDER — KETOROLAC TROMETHAMINE 30 MG/ML
30 INJECTION, SOLUTION INTRAMUSCULAR; INTRAVENOUS
Status: COMPLETED | OUTPATIENT
Start: 2025-03-21 | End: 2025-03-21

## 2025-03-21 RX ADMIN — KETOROLAC TROMETHAMINE 30 MG: 30 INJECTION, SOLUTION INTRAMUSCULAR at 11:28

## 2025-03-21 ASSESSMENT — ENCOUNTER SYMPTOMS
SHORTNESS OF BREATH: 0
VOMITING: 0
WHEEZING: 0
NAUSEA: 0
ABDOMINAL PAIN: 0
ANAL BLEEDING: 0
BLOOD IN STOOL: 0
COUGH: 0
ABDOMINAL DISTENTION: 0
DIARRHEA: 0

## 2025-03-21 ASSESSMENT — PAIN SCALES - GENERAL: PAINLEVEL_OUTOF10: 6

## 2025-03-21 NOTE — ED NOTES
Pt discharged in stable condition at this time. MD/[place] reviewed discharge instructions, prescriptions, and follow up with patient at bedside. Pt verbalized understanding and denies any needs or questions at this time.

## 2025-03-21 NOTE — ED PROVIDER NOTES
[facility]  EMERGENCY DEPARTMENT ENCOUNTER      Pt Name: Avi Waldrop  MRN: [id number]  Birthdate 1985  Date of evaluation: 3/21/2025  Provider: Stanley Johnson MD    CHIEF COMPLAINT       Chief Complaint   Patient presents with    Shoulder Injury         HISTORY OF PRESENT ILLNESS   (Location/Symptom, Timing/Onset, Context/Setting, Quality, Duration, Modifying Factors, Severity)  Note limiting factors.   39yoM w/ hx HTN, kidney stones and graves disease p/w shoulder and arm pain x1d. Pt states that he was at work when heavy construction material fell onto his shoulder and right arm. Now having pain to both shoulders and right arm. Pain worse w/ movement of upper ext. Denies getting hit in the head. No head/neck or chest/back or abd pain. No SOB or N/V. No ext weakness or sensory changes. Sent here from . Took tylenol 10hrs ago.            Review of External Medical Records:     Nursing Notes were reviewed.    REVIEW OF SYSTEMS    (2-9 systems for level 4, 10 or more for level 5)     Review of Systems   Constitutional:  Negative for diaphoresis and fever.   HENT:  Negative for nosebleeds.    Eyes:  Negative for visual disturbance.   Respiratory:  Negative for cough, shortness of breath and wheezing.    Cardiovascular:  Negative for chest pain, palpitations and leg swelling.   Gastrointestinal:  Negative for abdominal distention, abdominal pain, anal bleeding, blood in stool, diarrhea, nausea and vomiting.   Endocrine: Negative for polyuria.   Genitourinary:  Negative for difficulty urinating, dysuria and hematuria.   Musculoskeletal:  Positive for arthralgias and myalgias. Negative for joint swelling.   Skin:  Negative for wound.   Neurological:  Negative for dizziness, syncope and light-headedness.   Hematological:  Does not bruise/bleed easily.   Psychiatric/Behavioral:  Negative for confusion.        Except as noted above the remainder of the review of systems was reviewed and        PATIENT REFERRED TO:  No follow-up provider specified.    DISCHARGE MEDICATIONS:  New Prescriptions    No medications on file         Stanley Johnson MD (electronically signed)  Emergency Attending Physician

## 2025-03-21 NOTE — ED TRIAGE NOTES
Pt reports concrete boards falling on right shoulders today at work. Referred here for imaging of right shoulder through right wrist. Good PMS throughout RUL, reports some 'buzzing\" in right fingers.

## 2025-04-01 ENCOUNTER — TELEPHONE (OUTPATIENT)
Age: 40
End: 2025-04-01

## 2025-04-01 DIAGNOSIS — I10 ESSENTIAL HYPERTENSION: Primary | ICD-10-CM

## 2025-04-01 RX ORDER — LISINOPRIL 20 MG/1
20 TABLET ORAL DAILY
Qty: 90 TABLET | Refills: 1 | Status: SHIPPED | OUTPATIENT
Start: 2025-04-01

## 2025-04-01 NOTE — TELEPHONE ENCOUNTER
Patient called, ID x2. Patient recently had work related injury to shoulder and has been in PT. At the end of today's session BP reading 173/120- was advised to call cardiology. Patient claims he has had higher BP readings since this accident but has not been symptomatic.   Per patient, when he finished last bottle of Lisinopril and went to  refill, pharmacist said rx was cancelled- he \"assumed it was cancelled for a reason.\"  Advised patient to go home and rest prior to rechecking BP. Advised to call back to office or send [place] message with BP reading. Patient made appt for Thursday AM.

## 2025-04-01 NOTE — TELEPHONE ENCOUNTER
Requested Prescriptions     Signed Prescriptions Disp Refills    lisinopril (PRINIVIL;ZESTRIL) 20 MG tablet 90 tablet 1     Sig: Take 1 tablet by mouth daily     Authorizing Provider: ANURAG GARCES III     Ordering User: MARCO ARAMBULA MD.     Spoke to patient, ID x2. Advised of refill for Lisinopril and orders for [facility]- provided address for lab draw SP location. Advised patient to have labs drawn this week and return call next week with BP readings to determine need for follow up appt sooner than July. 4/3/25 appt cancelled. Patient verbalized understanding.

## 2025-04-03 DIAGNOSIS — I10 ESSENTIAL HYPERTENSION: ICD-10-CM

## 2025-04-03 LAB
ANION GAP SERPL CALC-SCNC: 3 MMOL/L (ref 2–12)
BUN SERPL-MCNC: 11 MG/DL (ref 6–20)
BUN/CREAT SERPL: 11 (ref 12–20)
CALCIUM SERPL-MCNC: 9.3 MG/DL (ref 8.5–10.1)
CHLORIDE SERPL-SCNC: 107 MMOL/L (ref 97–108)
CO2 SERPL-SCNC: 29 MMOL/L (ref 21–32)
CREAT SERPL-MCNC: 1.04 MG/DL (ref 0.7–1.3)
GLUCOSE SERPL-MCNC: 97 MG/DL (ref 65–100)
POTASSIUM SERPL-SCNC: 3.8 MMOL/L (ref 3.5–5.1)
SODIUM SERPL-SCNC: 139 MMOL/L (ref 136–145)

## 2025-04-04 ENCOUNTER — RESULTS FOLLOW-UP (OUTPATIENT)
Age: 40
End: 2025-04-04

## 2025-08-13 ENCOUNTER — OFFICE VISIT (OUTPATIENT)
Age: 40
End: 2025-08-13
Payer: MEDICAID

## 2025-08-13 VITALS
WEIGHT: 187 LBS | BODY MASS INDEX: 27.7 KG/M2 | DIASTOLIC BLOOD PRESSURE: 84 MMHG | OXYGEN SATURATION: 97 % | SYSTOLIC BLOOD PRESSURE: 124 MMHG | HEART RATE: 82 BPM | HEIGHT: 69 IN

## 2025-08-13 DIAGNOSIS — I10 ESSENTIAL HYPERTENSION: Primary | ICD-10-CM

## 2025-08-13 PROCEDURE — 3079F DIAST BP 80-89 MM HG: CPT | Performed by: SPECIALIST

## 2025-08-13 PROCEDURE — 99213 OFFICE O/P EST LOW 20 MIN: CPT | Performed by: SPECIALIST

## 2025-08-13 PROCEDURE — 3074F SYST BP LT 130 MM HG: CPT | Performed by: SPECIALIST

## 2025-08-13 RX ORDER — ASPIRIN 81 MG/1
81 TABLET ORAL DAILY
COMMUNITY